# Patient Record
Sex: FEMALE | Race: WHITE | NOT HISPANIC OR LATINO | Employment: FULL TIME | URBAN - METROPOLITAN AREA
[De-identification: names, ages, dates, MRNs, and addresses within clinical notes are randomized per-mention and may not be internally consistent; named-entity substitution may affect disease eponyms.]

---

## 2017-08-10 ENCOUNTER — OFFICE VISIT (OUTPATIENT)
Dept: FAMILY MEDICINE CLINIC | Facility: CLINIC | Age: 37
End: 2017-08-10

## 2017-08-10 VITALS
TEMPERATURE: 99.1 F | SYSTOLIC BLOOD PRESSURE: 116 MMHG | OXYGEN SATURATION: 99 % | BODY MASS INDEX: 21.79 KG/M2 | RESPIRATION RATE: 14 BRPM | HEIGHT: 70 IN | WEIGHT: 152.2 LBS | DIASTOLIC BLOOD PRESSURE: 68 MMHG | HEART RATE: 60 BPM

## 2017-08-10 DIAGNOSIS — E55.9 VITAMIN D DEFICIENCY: ICD-10-CM

## 2017-08-10 DIAGNOSIS — Z13.29 SCREENING FOR THYROID DISORDER: ICD-10-CM

## 2017-08-10 DIAGNOSIS — Z00.00 ENCOUNTER FOR HEALTH MAINTENANCE EXAMINATION: Primary | ICD-10-CM

## 2017-08-10 DIAGNOSIS — D64.9 ANEMIA, UNSPECIFIED TYPE: ICD-10-CM

## 2017-08-10 DIAGNOSIS — Z13.220 SCREENING FOR LIPID DISORDERS: ICD-10-CM

## 2017-08-10 DIAGNOSIS — Z13.1 SCREENING FOR DIABETES MELLITUS: ICD-10-CM

## 2017-08-10 PROBLEM — F41.9 ANXIETY: Status: ACTIVE | Noted: 2017-08-10

## 2017-08-10 PROBLEM — L70.9 ACNE: Status: ACTIVE | Noted: 2017-08-10

## 2017-08-10 PROCEDURE — 99385 PREV VISIT NEW AGE 18-39: CPT | Performed by: FAMILY MEDICINE

## 2017-08-10 RX ORDER — FLUOXETINE HYDROCHLORIDE 20 MG/1
20 CAPSULE ORAL DAILY
COMMUNITY
End: 2019-09-20 | Stop reason: SDUPTHER

## 2017-08-10 NOTE — PROGRESS NOTES
Subjective   Antonieta Hill is a 37 y.o. female.     Chief Complaint   Patient presents with   • Establish Care     Patient needs to establish a care. She needs to have lab work done.        HPI     Patient is a new patient to our office today for health maintenance exam.  She has a minor history of anemia and possible vitamin deficiency.  She is needing labwork to evaluate these.  She has 2-4 alcoholic beverages a week.  No tobacco or recreational drug use.  She maintains a healthy diet and exercise regimen.  She is up-to-date on Pap smears.  Has a family history of hyperlipidemia and liver issues.    The following portions of the patient's history were reviewed and updated as appropriate: allergies, current medications, past family history, past medical history, past social history, past surgical history and problem list.    Review of Systems   Constitutional: Negative for activity change.   All other systems reviewed and are negative.      Objective  Vitals:    08/10/17 1030   BP: 116/68   Pulse: 60   Resp: 14   Temp: 99.1 °F (37.3 °C)   SpO2: 99%        Physical Exam   Constitutional: She is oriented to person, place, and time. She appears well-developed and well-nourished. No distress.   HENT:   Head: Normocephalic and atraumatic.   Right Ear: External ear normal.   Left Ear: External ear normal.   Nose: Nose normal.   Mouth/Throat: Oropharynx is clear and moist.   Eyes: Conjunctivae and EOM are normal. Pupils are equal, round, and reactive to light. Right eye exhibits no discharge. Left eye exhibits no discharge. No scleral icterus.   Neck: Normal range of motion. Neck supple.   Cardiovascular: Normal rate, regular rhythm and normal heart sounds.  Exam reveals no friction rub.    No murmur heard.  Pulmonary/Chest: Effort normal and breath sounds normal. No respiratory distress. She has no wheezes. She has no rales.   Abdominal: Soft. Bowel sounds are normal. She exhibits no distension. There is no  tenderness. There is no rebound and no guarding.   Lymphadenopathy:     She has no cervical adenopathy.   Neurological: She is alert and oriented to person, place, and time.   Skin: Skin is warm and dry. She is not diaphoretic.   Nursing note and vitals reviewed.        Current Outpatient Prescriptions:   •  FLUoxetine (PROzac) 20 MG capsule, Take 20 mg by mouth Daily., Disp: , Rfl:   •  SPIRONOLACTONE PO, Take  by mouth., Disp: , Rfl:     Procedures    Lab Results (most recent)     None          Assessment/Plan   Antonieta was seen today for establish care.    Diagnoses and all orders for this visit:    Encounter for health maintenance examination    Screening for lipid disorders  -     Lipid Panel    Screening for thyroid disorder  -     TSH    Screening for diabetes mellitus  -     Comprehensive Metabolic Panel  -     Hemoglobin A1c    Anemia, unspecified type  -     CBC Auto Differential  -     Iron and TIBC  -     Vitamin B12    Vitamin D deficiency  -     Vitamin D 25 Hydroxy    Labs as above to evaluate current status and screening.  We'll adjust treatment plan based on those results.    Return for As needed.      Ron Berkowitz MD

## 2017-08-11 LAB
25(OH)D3+25(OH)D2 SERPL-MCNC: 31.5 NG/ML (ref 30–100)
ALBUMIN SERPL-MCNC: 4.1 G/DL (ref 3.5–5.2)
ALBUMIN/GLOB SERPL: 1.8 G/DL
ALP SERPL-CCNC: 41 U/L (ref 39–117)
ALT SERPL-CCNC: 13 U/L (ref 1–33)
AST SERPL-CCNC: 15 U/L (ref 1–32)
BASOPHILS # BLD AUTO: 0.03 10*3/MM3 (ref 0–0.2)
BASOPHILS NFR BLD AUTO: 0.7 % (ref 0–1.5)
BILIRUB SERPL-MCNC: 0.4 MG/DL (ref 0.1–1.2)
BUN SERPL-MCNC: 12 MG/DL (ref 6–20)
BUN/CREAT SERPL: 14.8 (ref 7–25)
CALCIUM SERPL-MCNC: 9.6 MG/DL (ref 8.6–10.5)
CHLORIDE SERPL-SCNC: 101 MMOL/L (ref 98–107)
CHOLEST SERPL-MCNC: 192 MG/DL (ref 0–200)
CO2 SERPL-SCNC: 26.3 MMOL/L (ref 22–29)
CREAT SERPL-MCNC: 0.81 MG/DL (ref 0.57–1)
EOSINOPHIL # BLD AUTO: 0.25 10*3/MM3 (ref 0–0.7)
EOSINOPHIL NFR BLD AUTO: 5.7 % (ref 0.3–6.2)
ERYTHROCYTE [DISTWIDTH] IN BLOOD BY AUTOMATED COUNT: 13.3 % (ref 11.7–13)
GLOBULIN SER CALC-MCNC: 2.3 GM/DL
GLUCOSE SERPL-MCNC: 82 MG/DL (ref 65–99)
HBA1C MFR BLD: 4.7 % (ref 4.8–5.6)
HCT VFR BLD AUTO: 38.4 % (ref 35.6–45.5)
HDLC SERPL-MCNC: 66 MG/DL (ref 40–60)
HGB BLD-MCNC: 12.5 G/DL (ref 11.9–15.5)
IMM GRANULOCYTES # BLD: 0 10*3/MM3 (ref 0–0.03)
IMM GRANULOCYTES NFR BLD: 0 % (ref 0–0.5)
IRON SATN MFR SERPL: 25 % (ref 20–50)
IRON SERPL-MCNC: 80 MCG/DL (ref 37–145)
LDLC SERPL CALC-MCNC: 107 MG/DL (ref 0–100)
LYMPHOCYTES # BLD AUTO: 1.25 10*3/MM3 (ref 0.9–4.8)
LYMPHOCYTES NFR BLD AUTO: 28.5 % (ref 19.6–45.3)
MCH RBC QN AUTO: 30.7 PG (ref 26.9–32)
MCHC RBC AUTO-ENTMCNC: 32.6 G/DL (ref 32.4–36.3)
MCV RBC AUTO: 94.3 FL (ref 80.5–98.2)
MONOCYTES # BLD AUTO: 0.47 10*3/MM3 (ref 0.2–1.2)
MONOCYTES NFR BLD AUTO: 10.7 % (ref 5–12)
NEUTROPHILS # BLD AUTO: 2.39 10*3/MM3 (ref 1.9–8.1)
NEUTROPHILS NFR BLD AUTO: 54.4 % (ref 42.7–76)
PLATELET # BLD AUTO: 202 10*3/MM3 (ref 140–500)
POTASSIUM SERPL-SCNC: 4.4 MMOL/L (ref 3.5–5.2)
PROT SERPL-MCNC: 6.4 G/DL (ref 6–8.5)
RBC # BLD AUTO: 4.07 10*6/MM3 (ref 3.9–5.2)
SODIUM SERPL-SCNC: 141 MMOL/L (ref 136–145)
TIBC SERPL-MCNC: 318 MCG/DL
TRIGL SERPL-MCNC: 93 MG/DL (ref 0–150)
TSH SERPL DL<=0.005 MIU/L-ACNC: 3.31 MIU/ML (ref 0.27–4.2)
UIBC SERPL-MCNC: 238 MCG/DL
VIT B12 SERPL-MCNC: 195 PG/ML (ref 211–946)
VLDLC SERPL CALC-MCNC: 18.6 MG/DL (ref 5–40)
WBC # BLD AUTO: 4.39 10*3/MM3 (ref 4.5–10.7)

## 2018-07-26 ENCOUNTER — OFFICE VISIT (OUTPATIENT)
Dept: FAMILY MEDICINE CLINIC | Facility: CLINIC | Age: 38
End: 2018-07-26

## 2018-07-26 VITALS
TEMPERATURE: 98.3 F | SYSTOLIC BLOOD PRESSURE: 116 MMHG | DIASTOLIC BLOOD PRESSURE: 60 MMHG | OXYGEN SATURATION: 97 % | HEART RATE: 59 BPM | BODY MASS INDEX: 21.9 KG/M2 | RESPIRATION RATE: 14 BRPM | WEIGHT: 153 LBS | HEIGHT: 70 IN

## 2018-07-26 DIAGNOSIS — R53.83 FATIGUE, UNSPECIFIED TYPE: ICD-10-CM

## 2018-07-26 DIAGNOSIS — E03.9 HYPOTHYROIDISM, UNSPECIFIED TYPE: Primary | ICD-10-CM

## 2018-07-26 PROCEDURE — 99214 OFFICE O/P EST MOD 30 MIN: CPT | Performed by: FAMILY MEDICINE

## 2018-07-26 RX ORDER — LEVOTHYROXINE SODIUM 0.1 MG/1
100 TABLET ORAL DAILY
Qty: 45 TABLET | Refills: 1 | Status: SHIPPED | OUTPATIENT
Start: 2018-07-26 | End: 2018-09-12 | Stop reason: SDUPTHER

## 2018-07-26 RX ORDER — SPIRONOLACTONE 100 MG/1
100 TABLET, FILM COATED ORAL DAILY
Refills: 11 | COMMUNITY
Start: 2018-07-06 | End: 2019-03-13 | Stop reason: SDUPTHER

## 2018-07-26 NOTE — PROGRESS NOTES
Antonieta Hill is a 38 y.o. female.     Chief Complaint   Patient presents with   • Thyroid Problem     Patient is following up on high thyroid levels. She has recent labs at [place].        HPI     Patient is stated to discuss some issues that are new to me.  Patient was recently seen by her OB/GYN and had a thyroid panel done.  It revealed a TSH of 8.6.  She has been complaining of fatigue as well as night sweats and just a myriad of issues.  She's also concerned about energy levels.  She does have a family history of thyroid issues.  She does have other autoimmune diseases including psoriasis.    The following portions of the patient's history were reviewed and updated as appropriate: allergies, current medications, past family history, past medical history, past social history, past surgical history and problem list.    Review of Systems   Constitutional: Negative for activity change.   All other systems reviewed and are negative.      Objective  Vitals:    07/26/18 0918   BP: 116/60   Pulse: 59   Resp: 14   Temp: 98.3 °F (36.8 °C)   SpO2: 97%       Physical Exam   Constitutional: She is oriented to person, place, and time. She appears well-developed and well-nourished. No distress.   HENT:   Head: Normocephalic and atraumatic.   Right Ear: External ear normal.   Left Ear: External ear normal.   Nose: Nose normal.   Mouth/Throat: Oropharynx is clear and moist.   Eyes: Pupils are equal, round, and reactive to light. Conjunctivae and EOM are normal. Right eye exhibits no discharge. Left eye exhibits no discharge. No scleral icterus.   Cardiovascular: Normal rate, regular rhythm and normal heart sounds.    Pulmonary/Chest: Effort normal and breath sounds normal. No respiratory distress. She has no wheezes. She has no rales.   Abdominal: Soft. Bowel sounds are normal. She exhibits no distension. There is no tenderness.   Neurological: She is alert and oriented to person, place, and time.   Skin: Skin is warm  and dry. She is not diaphoretic.   Nursing note and vitals reviewed.        Current Outpatient Prescriptions:   •  FLUoxetine (PROzac) 20 MG capsule, Take 20 mg by mouth Daily., Disp: , Rfl:   •  spironolactone (ALDACTONE) 100 MG tablet, Take 100 mg by mouth Daily., Disp: , Rfl: 11  •  levothyroxine (SYNTHROID) 100 MCG tablet, Take 1 tablet by mouth Daily., Disp: 45 tablet, Rfl: 1    Procedures    Lab Results (most recent)     None              Antonieta was seen today for thyroid problem.    Diagnoses and all orders for this visit:    Hypothyroidism, unspecified type  -     Thyroid Panel With TSH  -     Thyroid Peroxidase Antibody  -     Thyroid Stimulating Immunoglobulin  -     levothyroxine (SYNTHROID) 100 MCG tablet; Take 1 tablet by mouth Daily.    Fatigue, unspecified type    I reviewed the blood work that was done at [place].  Summarized as above.  We will start levothyroxine 100 µg.  We discussed potential adverse side effects.  We'll also get repeat labs before her next office visit to assess the dose as well as other potential causes of her thyroid issue.      Return in about 6 weeks (around 9/6/2018) for Recheck.      Ron Berkowitz MD

## 2018-09-01 LAB
FT4I SERPL CALC-MCNC: 2.8 (ref 1.2–4.9)
T3RU NFR SERPL: 32 % (ref 24–39)
T4 SERPL-MCNC: 8.6 UG/DL (ref 4.5–12)
THYROPEROXIDASE AB SERPL-ACNC: 39 IU/ML (ref 0–34)
TSH SERPL DL<=0.005 MIU/L-ACNC: 3.14 UIU/ML (ref 0.45–4.5)
TSI ACT/NOR SER: <0.1 IU/L (ref 0–0.55)

## 2018-09-12 ENCOUNTER — OFFICE VISIT (OUTPATIENT)
Dept: FAMILY MEDICINE CLINIC | Facility: CLINIC | Age: 38
End: 2018-09-12

## 2018-09-12 VITALS
BODY MASS INDEX: 22.76 KG/M2 | DIASTOLIC BLOOD PRESSURE: 60 MMHG | RESPIRATION RATE: 14 BRPM | SYSTOLIC BLOOD PRESSURE: 122 MMHG | HEIGHT: 70 IN | HEART RATE: 66 BPM | TEMPERATURE: 98 F | OXYGEN SATURATION: 98 % | WEIGHT: 159 LBS

## 2018-09-12 DIAGNOSIS — E04.9 THYROID ENLARGEMENT: ICD-10-CM

## 2018-09-12 DIAGNOSIS — E03.9 HYPOTHYROIDISM, UNSPECIFIED TYPE: Primary | ICD-10-CM

## 2018-09-12 PROCEDURE — 99213 OFFICE O/P EST LOW 20 MIN: CPT | Performed by: FAMILY MEDICINE

## 2018-09-12 RX ORDER — LEVOTHYROXINE SODIUM 0.1 MG/1
100 TABLET ORAL DAILY
Qty: 90 TABLET | Refills: 3 | Status: SHIPPED | OUTPATIENT
Start: 2018-09-12 | End: 2019-09-16 | Stop reason: SDUPTHER

## 2018-09-12 NOTE — PROGRESS NOTES
Antonieta Hill is a 38 y.o. female.     Chief Complaint   Patient presents with   • Thyroid Problem     Patient has a follow up on thyroid med.        HPI     Patient presents today for follow-up on lab work that was done recently.  Patient was recently started 100 µg of levothyroxine due to hypothyroidism that was symptomatic.  Lab work showed a normal TSH as well as thyroid panel.  She did have elevated thyroid peroxidase antibodies.  Does have a history of psoriasis which is managed by dermatology.  States that she feels much better after starting the levothyroxine.    The following portions of the patient's history were reviewed and updated as appropriate: allergies, current medications, past family history, past medical history, past social history, past surgical history and problem list.    Review of Systems   Constitutional: Negative for activity change.   All other systems reviewed and are negative.      Objective  Vitals:    09/12/18 0808   BP: 122/60   Pulse: 66   Resp: 14   Temp: 98 °F (36.7 °C)   SpO2: 98%       Physical Exam   Constitutional: She is oriented to person, place, and time. She appears well-developed and well-nourished. No distress.   HENT:   Head: Normocephalic and atraumatic.   Right Ear: External ear normal.   Left Ear: External ear normal.   Nose: Nose normal.   Mouth/Throat: Oropharynx is clear and moist.   Eyes: Pupils are equal, round, and reactive to light. Conjunctivae and EOM are normal. Right eye exhibits no discharge. Left eye exhibits no discharge. No scleral icterus.   Neck: Thyromegaly present.   Cardiovascular: Normal rate, regular rhythm and normal heart sounds.    Pulmonary/Chest: Effort normal and breath sounds normal. No respiratory distress. She has no wheezes. She has no rales.   Abdominal: Soft. Bowel sounds are normal. She exhibits no distension. There is no tenderness.   Neurological: She is alert and oriented to person, place, and time.   Skin: Skin is warm  and dry. She is not diaphoretic.   Nursing note and vitals reviewed.        Current Outpatient Prescriptions:   •  FLUoxetine (PROzac) 20 MG capsule, Take 20 mg by mouth Daily., Disp: , Rfl:   •  levothyroxine (SYNTHROID) 100 MCG tablet, Take 1 tablet by mouth Daily., Disp: 90 tablet, Rfl: 3  •  spironolactone (ALDACTONE) 100 MG tablet, Take 100 mg by mouth Daily., Disp: , Rfl: 11  Current outpatient and discharge medications have been reconciled for the patient.  Reviewed by: Ron Berkowitz MD      Procedures    Lab Results (most recent)     None                  Antonieta was seen today for thyroid problem.    Diagnoses and all orders for this visit:    Hypothyroidism, unspecified type  -     levothyroxine (SYNTHROID) 100 MCG tablet; Take 1 tablet by mouth Daily.  -     US Thyroid  -     Ambulatory Referral to Endocrinology    Thyroid enlargement  -     US Thyroid  -     Ambulatory Referral to Endocrinology    Will refer to endocrinology and get a thyroid ultrasound.  Reviewed the blood work the patient.  Refills given for levothyroxine 100 µg.      Return in about 4 weeks (around 10/10/2018) for Recheck.      Ron Berkowitz MD

## 2018-11-21 ENCOUNTER — OFFICE VISIT (OUTPATIENT)
Dept: ENDOCRINOLOGY | Age: 38
End: 2018-11-21

## 2018-11-21 VITALS
BODY MASS INDEX: 22.19 KG/M2 | DIASTOLIC BLOOD PRESSURE: 68 MMHG | OXYGEN SATURATION: 98 % | WEIGHT: 155 LBS | SYSTOLIC BLOOD PRESSURE: 104 MMHG | HEART RATE: 91 BPM | HEIGHT: 70 IN

## 2018-11-21 DIAGNOSIS — E03.9 ACQUIRED HYPOTHYROIDISM: Primary | ICD-10-CM

## 2018-11-21 DIAGNOSIS — E06.3 HASHIMOTO'S DISEASE: ICD-10-CM

## 2018-11-21 DIAGNOSIS — E04.0 SIMPLE GOITER: ICD-10-CM

## 2018-11-21 PROCEDURE — 99204 OFFICE O/P NEW MOD 45 MIN: CPT | Performed by: INTERNAL MEDICINE

## 2018-11-21 NOTE — PROGRESS NOTES
Chief Complaint   Patient presents with   • Hypothyroidism   NEW PATIENT APPOINTMENT/ HYPOTHYROIDISM    Antonieta MARCK Liz 38 y.o. WF presents as a new patient for the evaluation of Hypothyroidism. Consulted by Dr. Berkowitz.   Pt was diagnosed with hypothyroidism about a 5 - 6 months ago, that was on routine blood work up also based on her symptoms of fatigue and abnormal menstrual bleeding.     Pt has been started on levothyroxine 100 mcg oral daily based on her blood work up, does take medication on empty stomach.     Energy levels are better on the medication, weight has been stable, normal BM, no hair loss, c/o increased sweating along with night sweats, c/o dry skin, sleep is ok. c/o heat intolerance and no cold intolerance. No c/o tremors, racing of heart and no eye symptoms.   Denied c/o difficulty breathing, swallowing and change in voice.   Family hx of thyroid disease - in her grand mother.     Menarche at age 13, periods are regular for most part, endometrial ablation done due to heavy periods. No further bleeding episodes after the start of the thyroid medication.   She has 2 Kids of her own.   Didn't have the thyroid [place]    Reviewed primary care physician's/consulting physician documentation and lab results :   TSH - 8.3 in July 2018    I have reviewed the patient's allergies, medicines, past medical hx, family hx and social hx in detail.    History reviewed. No pertinent past medical history.    Family History   Problem Relation Age of Onset   • Liver disease Mother    • Hypertension Father    • Hyperlipidemia Father        Social History     Socioeconomic History   • Marital status:      Spouse name: Not on file   • Number of children: Not on file   • Years of education: Not on file   • Highest education level: Not on file   Social Needs   • Financial resource strain: Not on file   • Food insecurity - worry: Not on file   • Food insecurity - inability: Not on file   • Transportation needs -  "medical: Not on file   • Transportation needs - non-medical: Not on file   Occupational History   • Not on file   Tobacco Use   • Smoking status: Never Smoker   Substance and Sexual Activity   • Alcohol use: Yes     Comment: occa   • Drug use: No   • Sexual activity: Not on file   Other Topics Concern   • Not on file   Social History Narrative   • Not on file       No Known Allergies      Current Outpatient Medications:   •  FLUoxetine (PROzac) 20 MG capsule, Take 20 mg by mouth Daily., Disp: , Rfl:   •  levothyroxine (SYNTHROID) 100 MCG tablet, Take 1 tablet by mouth Daily., Disp: 90 tablet, Rfl: 3  •  spironolactone (ALDACTONE) 100 MG tablet, Take 100 mg by mouth Daily., Disp: , Rfl: 11     Review of Systems   Constitutional: Negative for appetite change, fatigue and fever.   Eyes: Negative for visual disturbance.   Respiratory: Negative for shortness of breath.    Cardiovascular: Negative for palpitations and leg swelling.   Gastrointestinal: Negative for abdominal pain and vomiting.   Endocrine: Negative for polydipsia and polyuria.   Musculoskeletal: Negative for joint swelling and neck pain.   Skin: Negative for rash.   Neurological: Negative for weakness and numbness.   Psychiatric/Behavioral: Negative for behavioral problems.       Objective:    /68   Pulse 91   Ht 177.8 cm (70\")   Wt 70.3 kg (155 lb)   SpO2 98%   BMI 22.24 kg/m²     Physical Exam   Constitutional: She is oriented to person, place, and time. She appears well-nourished.   HENT:   Head: Normocephalic and atraumatic.   Eyes: Conjunctivae and EOM are normal. No scleral icterus.   Neck: Normal range of motion. Neck supple. No thyromegaly present.   Cardiovascular: Normal rate and normal heart sounds. Exam reveals no friction rub.   No murmur heard.  Pulmonary/Chest: Effort normal and breath sounds normal. No stridor. She has no wheezes. She has no rales.   Abdominal: Soft. Bowel sounds are normal. She exhibits no distension. There is " no tenderness.   Musculoskeletal: She exhibits no edema or tenderness.   Lymphadenopathy:     She has no cervical adenopathy.   Neurological: She is alert and oriented to person, place, and time.   Skin: Skin is warm and dry. She is not diaphoretic.   Psychiatric: She has a normal mood and affect.   Vitals reviewed.      Results Review:    I reviewed the patient's new clinical results.    Office Visit on 07/26/2018   Component Date Value Ref Range Status   • TSH 08/30/2018 3.140  0.450 - 4.500 uIU/mL Final   • T4, Total 08/30/2018 8.6  4.5 - 12.0 ug/dL Final   • T3 Uptake 08/30/2018 32  24 - 39 % Final   • Free Thyroxine Index 08/30/2018 2.8  1.2 - 4.9 Final   • Thyroid Peroxidase Antibody 08/30/2018 39* 0 - 34 IU/mL Final   • Thyroid Stimulating Immunoglobulin 08/30/2018 <0.10  0.00 - 0.55 IU/L Final         Antonieta was seen today for hypothyroidism.    Diagnoses and all orders for this visit:    Acquired hypothyroidism  -     TSH  -     T4, Free  -     Vitamin D 25 Hydroxy  -     Vitamin B12 & Folate  -     TSH; Future  -     T4, Free; Future  -     Basic Metabolic Panel; Future  -     Vitamin B12 & Folate; Future  -     Vitamin D 25 Hydroxy; Future  -     Lipid Panel; Future    Hashimoto's disease  -     TSH  -     T4, Free  -     Vitamin D 25 Hydroxy  -     Vitamin B12 & Folate  -     TSH; Future  -     T4, Free; Future  -     Basic Metabolic Panel; Future  -     Vitamin B12 & Folate; Future  -     Vitamin D 25 Hydroxy; Future  -     Lipid Panel; Future    Simple goiter  -     US Thyroid; Future      Hypothyroidism - levels not stable.   Symptoms are worse  Will continue levothyroxin 100 µg oral daily  Will check thyroid panel.    Will check vitamin D, B12 levels    Explained to the patient that the TPO antibody will be positive which could be the cause for her hypothyroidism-Hashimoto's thyroiditis.    Thyroid enlargement  Will proceed with a thyroid ultrasound.    Thank you for asking me to see your  "patient, Antonieta Hill in consultation.        Blade Moreno MD  11/21/18    [facility] Dragon / transcription disclaimer:     \"Dictated utilizing Dragon dictation\".          "

## 2018-11-22 LAB
25(OH)D3+25(OH)D2 SERPL-MCNC: 29.5 NG/ML (ref 30–100)
FOLATE SERPL-MCNC: 15.2 NG/ML (ref 4.78–24.2)
T4 FREE SERPL-MCNC: 1.4 NG/DL (ref 0.93–1.7)
TSH SERPL DL<=0.005 MIU/L-ACNC: 1.12 MIU/ML (ref 0.27–4.2)
VIT B12 SERPL-MCNC: 298 PG/ML (ref 211–946)

## 2018-11-29 ENCOUNTER — HOSPITAL ENCOUNTER (OUTPATIENT)
Dept: ULTRASOUND IMAGING | Facility: HOSPITAL | Age: 38
Discharge: HOME | End: 2018-11-29
Attending: INTERNAL MEDICINE | Admitting: INTERNAL MEDICINE

## 2018-11-29 DIAGNOSIS — E04.0 SIMPLE GOITER: ICD-10-CM

## 2018-11-29 PROCEDURE — 76536 US EXAM OF HEAD AND NECK: CPT

## 2018-12-06 NOTE — PROGRESS NOTES
Mail results to pt, no treatment changes at this time. No thyroid nodules noted. No need for FNA at this time

## 2019-03-04 ENCOUNTER — OFFICE VISIT (OUTPATIENT)
Dept: FAMILY MEDICINE CLINIC | Facility: CLINIC | Age: 39
End: 2019-03-04

## 2019-03-04 VITALS
OXYGEN SATURATION: 99 % | RESPIRATION RATE: 18 BRPM | BODY MASS INDEX: 22.62 KG/M2 | HEIGHT: 70 IN | DIASTOLIC BLOOD PRESSURE: 66 MMHG | SYSTOLIC BLOOD PRESSURE: 110 MMHG | HEART RATE: 71 BPM | WEIGHT: 158 LBS | TEMPERATURE: 97.9 F

## 2019-03-04 DIAGNOSIS — M79.644 PAIN OF RIGHT MIDDLE FINGER: Primary | ICD-10-CM

## 2019-03-04 PROCEDURE — 73140 X-RAY EXAM OF FINGER(S): CPT | Performed by: FAMILY MEDICINE

## 2019-03-04 PROCEDURE — 99214 OFFICE O/P EST MOD 30 MIN: CPT | Performed by: FAMILY MEDICINE

## 2019-03-04 NOTE — PROGRESS NOTES
Antonieta Hill is a 38 y.o. female.     Chief Complaint   Patient presents with   • Hand Pain     patient jammed her right middle finger .       HPI     Patient presents to the office to discuss a problem that is new to me.  In January she was living and injured the middle finger in the right hand.  She had swelling and pain with bruising.  Since then she continues to have pain with range of motion.  Continues to have some swelling.  Has tried some mild over-the-counter anti-pain medications but they have not helped.    The following portions of the patient's history were reviewed and updated as appropriate: allergies, current medications, past family history, past medical history, past social history, past surgical history and problem list.    Review of Systems   Musculoskeletal: Negative for gait problem and joint swelling.   All other systems reviewed and are negative.      Objective  Vitals:    03/04/19 1545   BP: 110/66   Pulse: 71   Resp: 18   Temp: 97.9 °F (36.6 °C)   SpO2: 99%       Physical Exam   Constitutional: She is oriented to person, place, and time. She appears well-developed and well-nourished. No distress.   Musculoskeletal:        Right hand: She exhibits tenderness, deformity and swelling.        Hands:  Neurological: She is alert and oriented to person, place, and time.   Skin: She is not diaphoretic.   Psychiatric: She has a normal mood and affect. Her behavior is normal.   Nursing note and vitals reviewed.        Current Outpatient Medications:   •  FLUoxetine (PROzac) 20 MG capsule, Take 20 mg by mouth Daily., Disp: , Rfl:   •  levothyroxine (SYNTHROID) 100 MCG tablet, Take 1 tablet by mouth Daily., Disp: 90 tablet, Rfl: 3  •  spironolactone (ALDACTONE) 100 MG tablet, Take 100 mg by mouth Daily., Disp: , Rfl: 11  Current outpatient and discharge medications have been reconciled for the patient.  Reviewed by: Ron Berkowitz MD      Procedures    Lab Results (most recent)     None                   Antonieta was seen today for hand pain.    Diagnoses and all orders for this visit:    Pain of right middle finger  -     XR Finger 2+ View Right (In Office)  -     Ambulatory Referral to Hand Surgery      3 view of the right middle finger show no acute osseous abnormality.    Likely ligamentous injury.  Buddy taped in the office and will refer to hand.    Return for As needed.      Ron Berkowitz MD

## 2019-03-13 RX ORDER — SPIRONOLACTONE 100 MG/1
TABLET, FILM COATED ORAL
Qty: 30 TABLET | Refills: 0 | Status: SHIPPED | OUTPATIENT
Start: 2019-03-13 | End: 2019-04-13 | Stop reason: SDUPTHER

## 2019-04-15 RX ORDER — SPIRONOLACTONE 100 MG/1
TABLET, FILM COATED ORAL
Qty: 30 TABLET | Refills: 0 | Status: SHIPPED | OUTPATIENT
Start: 2019-04-15 | End: 2019-06-02 | Stop reason: SDUPTHER

## 2019-05-20 ENCOUNTER — RESULTS ENCOUNTER (OUTPATIENT)
Dept: ENDOCRINOLOGY | Age: 39
End: 2019-05-20

## 2019-05-20 DIAGNOSIS — E06.3 HASHIMOTO'S DISEASE: ICD-10-CM

## 2019-05-20 DIAGNOSIS — E03.9 ACQUIRED HYPOTHYROIDISM: ICD-10-CM

## 2019-05-21 ENCOUNTER — APPOINTMENT (OUTPATIENT)
Dept: LAB | Facility: HOSPITAL | Age: 39
End: 2019-05-21

## 2019-05-21 LAB
25(OH)D3 SERPL-MCNC: 27.7 NG/ML (ref 30–100)
ANION GAP SERPL CALCULATED.3IONS-SCNC: 10.3 MMOL/L
BUN BLD-MCNC: 12 MG/DL (ref 6–20)
BUN/CREAT SERPL: 16.4 (ref 7–25)
CALCIUM SPEC-SCNC: 9.1 MG/DL (ref 8.6–10.5)
CHLORIDE SERPL-SCNC: 103 MMOL/L (ref 98–107)
CHOLEST SERPL-MCNC: 160 MG/DL (ref 0–200)
CO2 SERPL-SCNC: 23.7 MMOL/L (ref 22–29)
CREAT BLD-MCNC: 0.73 MG/DL (ref 0.57–1)
FOLATE SERPL-MCNC: 12.2 NG/ML (ref 4.78–24.2)
GFR SERPL CREATININE-BSD FRML MDRD: 88 ML/MIN/1.73
GLUCOSE BLD-MCNC: 95 MG/DL (ref 65–99)
HDLC SERPL-MCNC: 61 MG/DL (ref 40–60)
LDLC SERPL CALC-MCNC: 87 MG/DL (ref 0–100)
LDLC/HDLC SERPL: 1.43 {RATIO}
POTASSIUM BLD-SCNC: 3.9 MMOL/L (ref 3.5–5.2)
SODIUM BLD-SCNC: 137 MMOL/L (ref 136–145)
T4 FREE SERPL-MCNC: 1.38 NG/DL (ref 0.93–1.7)
TRIGL SERPL-MCNC: 59 MG/DL (ref 0–150)
TSH SERPL DL<=0.05 MIU/L-ACNC: 1.54 MIU/ML (ref 0.27–4.2)
VIT B12 BLD-MCNC: 344 PG/ML (ref 211–946)
VLDLC SERPL-MCNC: 11.8 MG/DL (ref 5–40)

## 2019-05-21 PROCEDURE — 82746 ASSAY OF FOLIC ACID SERUM: CPT | Performed by: INTERNAL MEDICINE

## 2019-05-21 PROCEDURE — 80048 BASIC METABOLIC PNL TOTAL CA: CPT | Performed by: INTERNAL MEDICINE

## 2019-05-21 PROCEDURE — 84443 ASSAY THYROID STIM HORMONE: CPT | Performed by: INTERNAL MEDICINE

## 2019-05-21 PROCEDURE — 82607 VITAMIN B-12: CPT | Performed by: INTERNAL MEDICINE

## 2019-05-21 PROCEDURE — 36415 COLL VENOUS BLD VENIPUNCTURE: CPT | Performed by: INTERNAL MEDICINE

## 2019-05-21 PROCEDURE — 84439 ASSAY OF FREE THYROXINE: CPT | Performed by: INTERNAL MEDICINE

## 2019-05-21 PROCEDURE — 80061 LIPID PANEL: CPT | Performed by: INTERNAL MEDICINE

## 2019-05-21 PROCEDURE — 82306 VITAMIN D 25 HYDROXY: CPT | Performed by: INTERNAL MEDICINE

## 2019-06-03 RX ORDER — SPIRONOLACTONE 100 MG/1
TABLET, FILM COATED ORAL
Qty: 30 TABLET | Refills: 0 | Status: SHIPPED | OUTPATIENT
Start: 2019-06-03 | End: 2019-06-30 | Stop reason: SDUPTHER

## 2019-06-13 ENCOUNTER — OFFICE VISIT (OUTPATIENT)
Dept: ENDOCRINOLOGY | Age: 39
End: 2019-06-13

## 2019-06-13 VITALS
BODY MASS INDEX: 21.09 KG/M2 | SYSTOLIC BLOOD PRESSURE: 98 MMHG | HEART RATE: 82 BPM | DIASTOLIC BLOOD PRESSURE: 70 MMHG | OXYGEN SATURATION: 98 % | WEIGHT: 147 LBS

## 2019-06-13 DIAGNOSIS — E04.0 SIMPLE GOITER: ICD-10-CM

## 2019-06-13 DIAGNOSIS — E03.9 ACQUIRED HYPOTHYROIDISM: Primary | ICD-10-CM

## 2019-06-13 PROCEDURE — 99214 OFFICE O/P EST MOD 30 MIN: CPT | Performed by: INTERNAL MEDICINE

## 2019-06-13 NOTE — PROGRESS NOTES
38 y.o.    Patient Care Team:  Ron Berkowitz MD as PCP - General (Family Medicine)    Chief Complaint:    FOLLOW UP/ HYPOTHYROIDSIM  Subjective     HPI    Antonieta Hill 38 y.o. WF presents as a new patient for the evaluation of Hypothyroidism. Consulted by Dr. Berkowitz.   Pt was diagnosed with hypothyroidism about a 1 year ago, that was on routine blood work up also based on her symptoms of fatigue and abnormal menstrual bleeding.      Today in the clinic patient reports that she is on levothyroxine 100 mcg oral daily, compliant with the medication.    She does report that her energy levels have improved when the medication was initially started and now her energy levels have plateaued, weight has been stable, normal bowel movements, improved hair loss, no increased sweating, does complain of some dry skin, sleep is good.  Complains of heat intolerance.  No hyperthyroid symptoms.  Denied c/o difficulty breathing, swallowing and change in voice.   Family hx of thyroid disease - in her grand mother.      Menarche at age 13, periods are regular for most part, endometrial ablation done due to heavy periods.   She has 2 Kids of her own.     Thyroid ultrasound from November 2018  1. Mild thyromegaly with heterogeneous thyroid gland.  2. No discrete nodules are seen       Reviewed primary care physician's/consulting physician documentation and lab results :     Interval History      The following portions of the patient's history were reviewed and updated as appropriate: allergies, current medications, past family history, past medical history, past social history, past surgical history and problem list.    History reviewed. No pertinent past medical history.  Family History   Problem Relation Age of Onset   • Liver disease Mother    • Hypertension Father    • Hyperlipidemia Father      Social History     Socioeconomic History   • Marital status:      Spouse name: Not on file   • Number of children: Not on  file   • Years of education: Not on file   • Highest education level: Not on file   Tobacco Use   • Smoking status: Never Smoker   Substance and Sexual Activity   • Alcohol use: Yes     Comment: occa   • Drug use: No     No Known Allergies    Current Outpatient Medications:   •  FLUoxetine (PROzac) 20 MG capsule, Take 20 mg by mouth Daily., Disp: , Rfl:   •  levothyroxine (SYNTHROID) 100 MCG tablet, Take 1 tablet by mouth Daily., Disp: 90 tablet, Rfl: 3  •  spironolactone (ALDACTONE) 100 MG tablet, TAKE 1 TABLET BY MOUTH EVERY DAY, Disp: 30 tablet, Rfl: 0        Review of Systems   Constitutional: Positive for appetite change and fatigue. Negative for fever.   Eyes: Negative for visual disturbance.   Respiratory: Negative for shortness of breath.    Cardiovascular: Negative for palpitations and leg swelling.   Gastrointestinal: Negative for abdominal pain and vomiting.   Endocrine: Negative for polydipsia and polyuria.   Musculoskeletal: Negative for joint swelling and neck pain.   Skin: Negative for rash.   Neurological: Negative for weakness and numbness.   Psychiatric/Behavioral: Negative for behavioral problems.       Objective       Vitals:    06/13/19 1148   BP: 98/70   Pulse: 82   SpO2: 98%   Weight: 66.7 kg (147 lb)     Body mass index is 21.09 kg/m².      Physical Exam   Constitutional: She is oriented to person, place, and time. She appears well-nourished.   HENT:   Head: Normocephalic and atraumatic.   Eyes: Conjunctivae and EOM are normal. No scleral icterus.   Neck: Normal range of motion. Neck supple. No thyromegaly present.   Cardiovascular: Normal rate and normal heart sounds. Exam reveals no friction rub.   No murmur heard.  Pulmonary/Chest: Effort normal and breath sounds normal. No stridor. She has no wheezes. She has no rales.   Abdominal: Soft. Bowel sounds are normal. She exhibits no distension. There is no tenderness.   Musculoskeletal: She exhibits no edema or tenderness.   Lymphadenopathy:      She has no cervical adenopathy.   Neurological: She is alert and oriented to person, place, and time.   Skin: Skin is warm and dry. She is not diaphoretic.   Psychiatric: She has a normal mood and affect.   Vitals reviewed.    Results Review:     I reviewed the patient's new clinical results and mentioned them above in HPI and in plan as well.    Medical records reviewed  Summary:done      Results Encounter on 05/20/2019   Component Date Value Ref Range Status   • TSH 05/21/2019 1.540  0.270 - 4.200 mIU/mL Final   • Free T4 05/21/2019 1.38  0.93 - 1.70 ng/dL Final   • Glucose 05/21/2019 95  65 - 99 mg/dL Final   • BUN 05/21/2019 12  6 - 20 mg/dL Final   • Creatinine 05/21/2019 0.73  0.57 - 1.00 mg/dL Final   • Sodium 05/21/2019 137  136 - 145 mmol/L Final   • Potassium 05/21/2019 3.9  3.5 - 5.2 mmol/L Final   • Chloride 05/21/2019 103  98 - 107 mmol/L Final   • CO2 05/21/2019 23.7  22.0 - 29.0 mmol/L Final   • Calcium 05/21/2019 9.1  8.6 - 10.5 mg/dL Final   • eGFR Non African Amer 05/21/2019 88  >60 mL/min/1.73 Final   • BUN/Creatinine Ratio 05/21/2019 16.4  7.0 - 25.0 Final   • Anion Gap 05/21/2019 10.3  mmol/L Final   • Folate 05/21/2019 12.20  4.78 - 24.20 ng/mL Final   • Vitamin B-12 05/21/2019 344  211 - 946 pg/mL Final   • 25 Hydroxy, Vitamin D 05/21/2019 27.7* 30.0 - 100.0 ng/ml Final   • Total Cholesterol 05/21/2019 160  0 - 200 mg/dL Final   • Triglycerides 05/21/2019 59  0 - 150 mg/dL Final   • HDL Cholesterol 05/21/2019 61* 40 - 60 mg/dL Final   • LDL Cholesterol  05/21/2019 87  0 - 100 mg/dL Final   • VLDL Cholesterol 05/21/2019 11.8  5 - 40 mg/dL Final   • LDL/HDL Ratio 05/21/2019 1.43   Final     Lab Results   Component Value Date    HGBA1C 4.70 (L) 08/10/2017     Lab Results   Component Value Date    CREATININE 0.73 05/21/2019     Imaging Results (most recent)     None                Assessment and Plan:    Diagnoses and all orders for this visit:    Acquired hypothyroidism  -     TSH; Future  -   "   T4, Free; Future  -     Basic Metabolic Panel; Future  -     Vitamin B12 & Folate; Future  -     Vitamin D 25 Hydroxy; Future    Simple goiter  -     TSH; Future  -     T4, Free; Future  -     Basic Metabolic Panel; Future  -     Vitamin B12 & Folate; Future  -     Vitamin D 25 Hydroxy; Future      Hypothyroidism  Symptoms are worse  Explained to the patient that her clinical symptoms might not be related to her hypothyroidism as her thyroid panel is at goal.  Continue levothyroxine 100 mcg oral daily    Simple goiter  Noted no thyroid nodules on the thyroid ultrasound from November 2018  Explained to the patient that her thyroid is in general and enlarged but does not have any nodules that require a biopsy    On spironolactone due to acne, advised the patient to discuss with her dermatologist if she can either come off of the medication or decrease the dosage.    Reviewed Lab results with the patient.             Blade Moreno MD  06/13/19    EMR Dragon / transcription disclaimer:     \"Dictated utilizing Dragon dictation\".  "

## 2019-07-01 RX ORDER — SPIRONOLACTONE 100 MG/1
TABLET, FILM COATED ORAL
Qty: 30 TABLET | Refills: 0 | Status: SHIPPED | OUTPATIENT
Start: 2019-07-01 | End: 2019-08-05 | Stop reason: SDUPTHER

## 2019-08-05 RX ORDER — SPIRONOLACTONE 100 MG/1
TABLET, FILM COATED ORAL
Qty: 30 TABLET | Refills: 0 | Status: SHIPPED | OUTPATIENT
Start: 2019-08-05 | End: 2019-09-07 | Stop reason: SDUPTHER

## 2019-09-09 RX ORDER — SPIRONOLACTONE 100 MG/1
TABLET, FILM COATED ORAL
Qty: 30 TABLET | Refills: 0 | Status: SHIPPED | OUTPATIENT
Start: 2019-09-09 | End: 2019-09-20 | Stop reason: SDUPTHER

## 2019-09-09 RX ORDER — SPIRONOLACTONE 100 MG/1
TABLET, FILM COATED ORAL
Qty: 30 TABLET | Refills: 0 | Status: SHIPPED | OUTPATIENT
Start: 2019-09-09 | End: 2019-10-11 | Stop reason: SDUPTHER

## 2019-09-13 DIAGNOSIS — E03.9 HYPOTHYROIDISM, UNSPECIFIED TYPE: ICD-10-CM

## 2019-09-13 RX ORDER — LEVOTHYROXINE SODIUM 0.1 MG/1
100 TABLET ORAL DAILY
Qty: 90 TABLET | Refills: 0 | Status: CANCELLED | OUTPATIENT
Start: 2019-09-13

## 2019-09-16 DIAGNOSIS — E03.9 HYPOTHYROIDISM, UNSPECIFIED TYPE: ICD-10-CM

## 2019-09-16 RX ORDER — LEVOTHYROXINE SODIUM 0.1 MG/1
100 TABLET ORAL DAILY
Qty: 90 TABLET | Refills: 0 | Status: SHIPPED | OUTPATIENT
Start: 2019-09-16 | End: 2019-09-18 | Stop reason: SDUPTHER

## 2019-09-18 DIAGNOSIS — E03.9 HYPOTHYROIDISM, UNSPECIFIED TYPE: ICD-10-CM

## 2019-09-18 RX ORDER — LEVOTHYROXINE SODIUM 0.1 MG/1
100 TABLET ORAL DAILY
Qty: 90 TABLET | Refills: 0 | Status: SHIPPED | OUTPATIENT
Start: 2019-09-18 | End: 2020-03-24

## 2019-09-20 ENCOUNTER — OFFICE VISIT (OUTPATIENT)
Dept: FAMILY MEDICINE CLINIC | Facility: CLINIC | Age: 39
End: 2019-09-20

## 2019-09-20 VITALS
DIASTOLIC BLOOD PRESSURE: 62 MMHG | SYSTOLIC BLOOD PRESSURE: 118 MMHG | RESPIRATION RATE: 16 BRPM | HEART RATE: 69 BPM | HEIGHT: 70 IN | OXYGEN SATURATION: 100 % | WEIGHT: 149 LBS | BODY MASS INDEX: 21.33 KG/M2 | TEMPERATURE: 98.3 F

## 2019-09-20 DIAGNOSIS — R21 SKIN RASH: ICD-10-CM

## 2019-09-20 DIAGNOSIS — F41.9 ANXIETY: Primary | ICD-10-CM

## 2019-09-20 PROCEDURE — 99214 OFFICE O/P EST MOD 30 MIN: CPT | Performed by: FAMILY MEDICINE

## 2019-09-20 RX ORDER — FLUOXETINE HYDROCHLORIDE 40 MG/1
40 CAPSULE ORAL DAILY
Qty: 30 CAPSULE | Refills: 1 | Status: SHIPPED | OUTPATIENT
Start: 2019-09-20 | End: 2019-11-05 | Stop reason: SDUPTHER

## 2019-09-20 NOTE — PROGRESS NOTES
Antonieta Hill is a 39 y.o. female.     Chief Complaint   Patient presents with   • Anxiety     patient needs to discuss her anxiety med.   • Rash     patient is having rash it is painful, it is in her buttocks.        HPI     Patient presents the office today to follow-up on anxiety as well as to discuss an issue that is new to me.  Patient has been taking Prozac 20 mg daily with moderate results up until about 6 weeks ago.  She does have increased episodes of sadness and crying.  Has been under quite a bit of stress.  No suicidal thoughts or ideation.  Patient also states that she has a rash on her left buttock.  It is painful.  Not overly itchy.    The following portions of the patient's history were reviewed and updated as appropriate: allergies, current medications, past family history, past medical history, past social history, past surgical history and problem list.    Review of Systems   Skin: Positive for color change and rash.   Psychiatric/Behavioral: The patient is nervous/anxious.    All other systems reviewed and are negative.    I have reviewed and agree with documentation of above ROS.    Objective  Vitals:    09/20/19 1550   BP: 118/62   Pulse: 69   Resp: 16   Temp: 98.3 °F (36.8 °C)   SpO2: 100%       Physical Exam   Constitutional: She is oriented to person, place, and time. She appears well-developed and well-nourished. No distress.   HENT:   Head: Normocephalic and atraumatic.   Right Ear: External ear normal.   Left Ear: External ear normal.   Nose: Nose normal.   Mouth/Throat: Oropharynx is clear and moist.   Eyes: Conjunctivae and EOM are normal. Pupils are equal, round, and reactive to light. Right eye exhibits no discharge. Left eye exhibits no discharge. No scleral icterus.   Cardiovascular: Normal rate, regular rhythm and normal heart sounds.   Pulmonary/Chest: Effort normal and breath sounds normal. No respiratory distress. She has no wheezes. She has no rales.   Abdominal:  Soft. Bowel sounds are normal. She exhibits no distension. There is no tenderness.   Neurological: She is alert and oriented to person, place, and time.   Skin: Skin is warm and dry. She is not diaphoretic.        On the left gluteal area there is an area of erythematous, raised, vesicular lesions.   Nursing note and vitals reviewed.        Current Outpatient Medications:   •  FLUoxetine (PROzac) 40 MG capsule, Take 1 capsule by mouth Daily., Disp: 30 capsule, Rfl: 1  •  levothyroxine (SYNTHROID, LEVOTHROID) 100 MCG tablet, TAKE 1 TABLET BY MOUTH DAILY, Disp: 90 tablet, Rfl: 0  •  spironolactone (ALDACTONE) 100 MG tablet, TAKE 1 TABLET BY MOUTH EVERY DAY (Patient taking differently: TAKE half  TABLET BY MOUTH EVERY DAY), Disp: 30 tablet, Rfl: 0  Current outpatient and discharge medications have been reconciled for the patient.  Reviewed by: Ron Berkowitz MD      Procedures    Lab Results (most recent)     None                  Antonieta was seen today for anxiety and rash.    Diagnoses and all orders for this visit:    Anxiety  -     FLUoxetine (PROzac) 40 MG capsule; Take 1 capsule by mouth Daily.    Skin rash  -     HIV-1 / O / 2 Ag / Antibody 4th Generation  -     HSV 1 & 2 - Specific Antibody, IgG  -     HSV 1 & 2 IgM, Antibodies, Indirect  -     RPR  -     Ambulatory Referral to Dermatology    Unsure of the etiology surrounding the patient's rash.  It does appear to be herpetic in nature.  Could be shingles but could also be genital herpes.  Will get labs as above to rule out genital herpes.  Will increase Prozac to 40 mg daily.  We will follow-up in 4 weeks.      Return in about 4 weeks (around 10/18/2019) for Recheck.      Ron Berkowitz MD

## 2019-09-23 LAB
HIV 1+2 AB+HIV1 P24 AG SERPL QL IA: NON REACTIVE
HSV1 IGG SER IA-ACNC: <0.91 INDEX (ref 0–0.9)
HSV1 IGM TITR SER IF: NORMAL TITER
HSV2 IGG SER IA-ACNC: <0.91 INDEX (ref 0–0.9)
HSV2 IGM TITR SER IF: NORMAL TITER
RPR SER QL: NORMAL

## 2019-10-11 RX ORDER — SPIRONOLACTONE 100 MG/1
TABLET, FILM COATED ORAL
Qty: 30 TABLET | Refills: 0 | Status: SHIPPED | OUTPATIENT
Start: 2019-10-11 | End: 2019-12-16 | Stop reason: SDUPTHER

## 2019-11-05 ENCOUNTER — OFFICE VISIT (OUTPATIENT)
Dept: FAMILY MEDICINE CLINIC | Facility: CLINIC | Age: 39
End: 2019-11-05

## 2019-11-05 VITALS
OXYGEN SATURATION: 98 % | WEIGHT: 155.2 LBS | BODY MASS INDEX: 22.22 KG/M2 | RESPIRATION RATE: 16 BRPM | HEART RATE: 70 BPM | HEIGHT: 70 IN | TEMPERATURE: 98.3 F | DIASTOLIC BLOOD PRESSURE: 60 MMHG | SYSTOLIC BLOOD PRESSURE: 122 MMHG

## 2019-11-05 DIAGNOSIS — F41.9 ANXIETY: ICD-10-CM

## 2019-11-05 PROCEDURE — 99213 OFFICE O/P EST LOW 20 MIN: CPT | Performed by: FAMILY MEDICINE

## 2019-11-05 RX ORDER — FLUOXETINE HYDROCHLORIDE 40 MG/1
40 CAPSULE ORAL DAILY
Qty: 90 CAPSULE | Refills: 3 | Status: SHIPPED | OUTPATIENT
Start: 2019-11-05 | End: 2021-01-05

## 2019-11-05 NOTE — PROGRESS NOTES
Antonieta Hill is a 39 y.o. female.     Chief Complaint   Patient presents with   • Anxiety     patient is following up on increased prozac.   • Herpes Zoster     patient si following up on blood work results.       HPI     Patient here to follow-up on anxiety.  Taking and tolerating an increased dose of Prozac 40 mg daily.  No adverse side effects noted.  Good symptom control.    The following portions of the patient's history were reviewed and updated as appropriate: allergies, current medications, past family history, past medical history, past social history, past surgical history and problem list.    Review of Systems   Skin: Positive for rash.   Psychiatric/Behavioral: Positive for sleep disturbance. The patient is nervous/anxious.    All other systems reviewed and are negative.    I have reviewed the ROS as documented by the MA. Ron Berkowitz MD    Objective  Vitals:    11/05/19 0823   BP: 122/60   Pulse: 70   Resp: 16   Temp: 98.3 °F (36.8 °C)   SpO2: 98%     Body mass index is 22.27 kg/m².    Physical Exam   Constitutional: She is oriented to person, place, and time. She appears well-developed and well-nourished. No distress.   HENT:   Head: Normocephalic and atraumatic.   Right Ear: External ear normal.   Left Ear: External ear normal.   Nose: Nose normal.   Mouth/Throat: Oropharynx is clear and moist.   Eyes: Conjunctivae and EOM are normal. Pupils are equal, round, and reactive to light. Right eye exhibits no discharge. Left eye exhibits no discharge. No scleral icterus.   Cardiovascular: Normal rate, regular rhythm and normal heart sounds.   Pulmonary/Chest: Effort normal and breath sounds normal. No respiratory distress. She has no wheezes. She has no rales.   Abdominal: Soft. Bowel sounds are normal. She exhibits no distension. There is no tenderness.   Neurological: She is alert and oriented to person, place, and time.   Skin: Skin is warm and dry. She is not diaphoretic.   Nursing note and  vitals reviewed.        Current Outpatient Medications:   •  FLUoxetine (PROzac) 40 MG capsule, Take 1 capsule by mouth Daily., Disp: 90 capsule, Rfl: 3  •  levothyroxine (SYNTHROID, LEVOTHROID) 100 MCG tablet, TAKE 1 TABLET BY MOUTH DAILY (Patient taking differently: Take 50 mcg by mouth Daily.), Disp: 90 tablet, Rfl: 0  •  spironolactone (ALDACTONE) 100 MG tablet, TAKE 1 TABLET BY MOUTH EVERY DAY (Patient taking differently: TAKE 1/2 TABLET BY MOUTH EVERY DAY), Disp: 30 tablet, Rfl: 0  Current outpatient and discharge medications have been reconciled for the patient.  Reviewed by: Ron Berkowitz MD      Procedures    Lab Results (most recent)     None                  Antonieta was seen today for anxiety and herpes zoster.    Diagnoses and all orders for this visit:    Anxiety  -     FLUoxetine (PROzac) 40 MG capsule; Take 1 capsule by mouth Daily.      Continue current therapy.  90-day supply given.    Return for As needed.      Ron Berkowitz MD

## 2019-11-11 RX ORDER — SPIRONOLACTONE 100 MG/1
TABLET, FILM COATED ORAL
Qty: 30 TABLET | Refills: 0 | Status: SHIPPED | OUTPATIENT
Start: 2019-11-11 | End: 2020-01-20

## 2019-12-16 RX ORDER — SPIRONOLACTONE 100 MG/1
TABLET, FILM COATED ORAL
Qty: 30 TABLET | Refills: 0 | Status: SHIPPED | OUTPATIENT
Start: 2019-12-16 | End: 2020-02-18

## 2019-12-18 DIAGNOSIS — F41.9 ANXIETY: ICD-10-CM

## 2019-12-18 RX ORDER — FLUOXETINE HYDROCHLORIDE 40 MG/1
40 CAPSULE ORAL DAILY
Qty: 90 CAPSULE | Refills: 3 | Status: SHIPPED | OUTPATIENT
Start: 2019-12-18 | End: 2020-12-17 | Stop reason: SDUPTHER

## 2020-01-20 RX ORDER — SPIRONOLACTONE 100 MG/1
TABLET, FILM COATED ORAL
Qty: 30 TABLET | Refills: 11 | Status: SHIPPED | OUTPATIENT
Start: 2020-01-20 | End: 2021-01-05

## 2020-02-18 RX ORDER — SPIRONOLACTONE 100 MG/1
TABLET, FILM COATED ORAL
Qty: 90 TABLET | Refills: 3 | Status: SHIPPED | OUTPATIENT
Start: 2020-02-18 | End: 2021-04-30 | Stop reason: SDUPTHER

## 2020-02-21 ENCOUNTER — LAB (OUTPATIENT)
Dept: ENDOCRINOLOGY | Age: 40
End: 2020-02-21

## 2020-02-21 DIAGNOSIS — E04.0 SIMPLE GOITER: ICD-10-CM

## 2020-02-21 DIAGNOSIS — E03.9 ACQUIRED HYPOTHYROIDISM: ICD-10-CM

## 2020-02-21 LAB
25(OH)D3+25(OH)D2 SERPL-MCNC: 27.9 NG/ML (ref 30–100)
BUN SERPL-MCNC: 14 MG/DL (ref 6–20)
BUN/CREAT SERPL: 15.7 (ref 7–25)
CALCIUM SERPL-MCNC: 9.4 MG/DL (ref 8.6–10.5)
CHLORIDE SERPL-SCNC: 98 MMOL/L (ref 98–107)
CO2 SERPL-SCNC: 27.7 MMOL/L (ref 22–29)
CREAT SERPL-MCNC: 0.89 MG/DL (ref 0.57–1)
FOLATE SERPL-MCNC: 10.9 NG/ML (ref 4.78–24.2)
GLUCOSE SERPL-MCNC: 96 MG/DL (ref 65–99)
POTASSIUM SERPL-SCNC: 4.3 MMOL/L (ref 3.5–5.2)
SODIUM SERPL-SCNC: 137 MMOL/L (ref 136–145)
T4 FREE SERPL-MCNC: 1.54 NG/DL (ref 0.93–1.7)
TSH SERPL DL<=0.005 MIU/L-ACNC: 2.61 UIU/ML (ref 0.27–4.2)
VIT B12 SERPL-MCNC: 312 PG/ML (ref 211–946)

## 2020-03-24 DIAGNOSIS — E03.9 HYPOTHYROIDISM, UNSPECIFIED TYPE: ICD-10-CM

## 2020-03-24 RX ORDER — LEVOTHYROXINE SODIUM 0.1 MG/1
100 TABLET ORAL DAILY
Qty: 90 TABLET | Refills: 0 | Status: SHIPPED | OUTPATIENT
Start: 2020-03-24 | End: 2020-06-25

## 2020-06-15 ENCOUNTER — TELEPHONE (OUTPATIENT)
Dept: FAMILY MEDICINE CLINIC | Facility: CLINIC | Age: 40
End: 2020-06-15

## 2020-06-15 NOTE — TELEPHONE ENCOUNTER
PATIENT CALLED AND WOULD LIKE TO KNOW IF SHE CAN HAVE AN ANTIBODY COVID-19 TEST DONE. SHE STATES THAT SHE HAD COVID-19 IN February BUT SHE WAS TESTED FOR THE FLU AND FOR STREP BUT TESTED NEGATIVE EVEN THOUGH SHE HAD ALL OF THE SYMPTOMS. PLEASE ADVISE.     PATIENT CALL BACK [phone]

## 2020-06-18 NOTE — TELEPHONE ENCOUNTER
Informed pt that as of right now our providers cannot say that any antibody testing is reliable, pt agreeable

## 2020-06-25 DIAGNOSIS — E03.9 HYPOTHYROIDISM, UNSPECIFIED TYPE: ICD-10-CM

## 2020-06-25 RX ORDER — LEVOTHYROXINE SODIUM 0.1 MG/1
100 TABLET ORAL DAILY
Qty: 90 TABLET | Refills: 0 | Status: SHIPPED | OUTPATIENT
Start: 2020-06-25 | End: 2020-10-29 | Stop reason: SDUPTHER

## 2020-10-13 DIAGNOSIS — E03.9 HYPOTHYROIDISM, UNSPECIFIED TYPE: ICD-10-CM

## 2020-10-13 RX ORDER — LEVOTHYROXINE SODIUM 0.1 MG/1
100 TABLET ORAL DAILY
Qty: 30 TABLET | Refills: 0 | Status: CANCELLED | OUTPATIENT
Start: 2020-10-13

## 2020-10-27 ENCOUNTER — TELEPHONE (OUTPATIENT)
Dept: FAMILY MEDICINE CLINIC | Facility: CLINIC | Age: 40
End: 2020-10-27

## 2020-10-27 NOTE — TELEPHONE ENCOUNTER
PATIENT IS RETURNING A CALL THAT SHE MISSED TODAY 10-27-20      PLEASE CONTACT PATIENT @[phone]

## 2020-10-29 DIAGNOSIS — E03.9 HYPOTHYROIDISM, UNSPECIFIED TYPE: ICD-10-CM

## 2020-10-29 NOTE — TELEPHONE ENCOUNTER
PATIENT CALLED IN REFERENCE TO HER MED REFILL OF    levothyroxine (SYNTHROID, LEVOTHROID) 100 MCG tablet    SHE HAS BEEN OUT OF MEDICATION FOR 2 WEEKS    SHE HAS CALLED EARLIER AND NOT AT HER PHARMACY    [id number] - [place], [place] - [address] AT [facility] - [phone]  - [phone]   [phone]    CALL BACK NUMBER [phone]

## 2020-10-30 RX ORDER — LEVOTHYROXINE SODIUM 0.1 MG/1
100 TABLET ORAL DAILY
Qty: 90 TABLET | Refills: 0 | Status: SHIPPED | OUTPATIENT
Start: 2020-10-30 | End: 2021-01-05 | Stop reason: SDUPTHER

## 2020-10-30 NOTE — TELEPHONE ENCOUNTER
MS. GARCIA SAYS THAT SHE HAS CALLED EVERYDAY THIS WEEK TO GET A REFILL OF HER LEVOTHYROXINE. SHE HAS NOT TAKEN HER MEDICINE IN 2 1/2 WEEKS.         SHE IS WANTING A CALL BACK TODAY.   LizAntonieta cunningham (Self) [phone]      [id number] [id number] [place] [address] AT [facility] & [address] - [phone]  - [phone]   [phone]        levothyroxine (SYNTHROID, LEVOTHROID) 100 MCG tablet  100 mcg, Daily 0 ordered  EditCancel Reorder       Summary: TAKE 1 TABLET BY MOUTH DAILY, Starting Thu 6/25/2020, Normal

## 2020-12-17 DIAGNOSIS — F41.9 ANXIETY: ICD-10-CM

## 2020-12-17 RX ORDER — FLUOXETINE HYDROCHLORIDE 40 MG/1
40 CAPSULE ORAL DAILY
Qty: 90 CAPSULE | Refills: 3 | Status: SHIPPED | OUTPATIENT
Start: 2020-12-17 | End: 2021-12-20

## 2021-01-05 ENCOUNTER — OFFICE VISIT (OUTPATIENT)
Dept: FAMILY MEDICINE CLINIC | Facility: CLINIC | Age: 41
End: 2021-01-05

## 2021-01-05 VITALS
SYSTOLIC BLOOD PRESSURE: 106 MMHG | DIASTOLIC BLOOD PRESSURE: 68 MMHG | HEIGHT: 70 IN | HEART RATE: 74 BPM | OXYGEN SATURATION: 98 % | WEIGHT: 168 LBS | BODY MASS INDEX: 24.05 KG/M2 | TEMPERATURE: 97.1 F

## 2021-01-05 DIAGNOSIS — T24.232D PARTIAL THICKNESS BURN OF LEFT LOWER LEG, SUBSEQUENT ENCOUNTER: ICD-10-CM

## 2021-01-05 DIAGNOSIS — T25.222D PARTIAL THICKNESS BURN OF LEFT FOOT, SUBSEQUENT ENCOUNTER: ICD-10-CM

## 2021-01-05 DIAGNOSIS — E03.9 HYPOTHYROIDISM, UNSPECIFIED TYPE: ICD-10-CM

## 2021-01-05 DIAGNOSIS — T22.212D PARTIAL THICKNESS BURN OF LEFT FOREARM, SUBSEQUENT ENCOUNTER: Primary | ICD-10-CM

## 2021-01-05 PROBLEM — T22.212A SECOND DEGREE BURN OF LEFT FOREARM: Status: ACTIVE | Noted: 2021-01-05

## 2021-01-05 PROBLEM — T24.232A SECOND DEGREE BURN OF LEFT LOWER LEG: Status: ACTIVE | Noted: 2021-01-05

## 2021-01-05 PROBLEM — T25.222A SECOND DEGREE BURN OF LEFT FOOT: Status: ACTIVE | Noted: 2021-01-05

## 2021-01-05 PROCEDURE — 99213 OFFICE O/P EST LOW 20 MIN: CPT | Performed by: NURSE PRACTITIONER

## 2021-01-05 RX ORDER — CLOBETASOL PROPIONATE 0.46 MG/ML
SOLUTION TOPICAL
COMMUNITY
Start: 2020-11-09

## 2021-01-05 RX ORDER — METRONIDAZOLE 7.5 MG/G
GEL VAGINAL
COMMUNITY
Start: 2020-11-01 | End: 2022-04-18

## 2021-01-05 RX ORDER — DIPHENOXYLATE HYDROCHLORIDE AND ATROPINE SULFATE 2.5; .025 MG/1; MG/1
1 TABLET ORAL DAILY
COMMUNITY

## 2021-01-05 RX ORDER — LEVOTHYROXINE SODIUM 0.1 MG/1
100 TABLET ORAL DAILY
Qty: 90 TABLET | Refills: 0 | Status: SHIPPED | OUTPATIENT
Start: 2021-01-05 | End: 2021-04-23
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
## 2021-03-03 ENCOUNTER — OFFICE VISIT (OUTPATIENT)
Dept: FAMILY MEDICINE CLINIC | Facility: CLINIC | Age: 41
End: 2021-03-03

## 2021-03-03 VITALS
SYSTOLIC BLOOD PRESSURE: 110 MMHG | HEART RATE: 90 BPM | WEIGHT: 154 LBS | BODY MASS INDEX: 22.05 KG/M2 | DIASTOLIC BLOOD PRESSURE: 68 MMHG | TEMPERATURE: 97.7 F | HEIGHT: 70 IN | OXYGEN SATURATION: 99 %

## 2021-03-03 DIAGNOSIS — Z13.0 SCREENING FOR IRON DEFICIENCY ANEMIA: ICD-10-CM

## 2021-03-03 DIAGNOSIS — Z00.00 ROUTINE GENERAL MEDICAL EXAMINATION AT A HEALTH CARE FACILITY: ICD-10-CM

## 2021-03-03 DIAGNOSIS — Z13.1 SCREENING FOR DIABETES MELLITUS: ICD-10-CM

## 2021-03-03 DIAGNOSIS — Z00.00 PHYSICAL EXAM, ROUTINE: Primary | ICD-10-CM

## 2021-03-03 DIAGNOSIS — Z13.6 SCREENING FOR ISCHEMIC HEART DISEASE: ICD-10-CM

## 2021-03-03 LAB
BILIRUB BLD-MCNC: NEGATIVE MG/DL
CLARITY, POC: CLEAR
COLOR UR: YELLOW
GLUCOSE UR STRIP-MCNC: NEGATIVE MG/DL
KETONES UR QL: NEGATIVE
LEUKOCYTE EST, POC: NEGATIVE
NITRITE UR-MCNC: NEGATIVE MG/ML
PH UR: 5 [PH] (ref 5–8)
PROT UR STRIP-MCNC: NEGATIVE MG/DL
RBC # UR STRIP: NEGATIVE /UL
SP GR UR: 1.02 (ref 1–1.03)
UROBILINOGEN UR QL: NORMAL

## 2021-03-03 PROCEDURE — 81003 URINALYSIS AUTO W/O SCOPE: CPT | Performed by: NURSE PRACTITIONER

## 2021-03-03 PROCEDURE — 99396 PREV VISIT EST AGE 40-64: CPT | Performed by: NURSE PRACTITIONER

## 2021-03-03 NOTE — PROGRESS NOTES
"Chief Complaint  Annual Exam (physcial - fasting)    Subjective          Antonieta Hill presents to [facility]  History of Present Illness  Well Adult Physical: Patient here for a comprehensive physical exam.The patient reports no problems  Do you take any herbs or supplements that were not prescribed by a doctor? no Are you taking calcium supplements? no Are you taking aspirin daily? no      Review of Systems   All other systems reviewed and are negative.    Objective   Vital Signs:   /68 (BP Location: Right arm, Patient Position: Sitting)   Pulse 90   Temp 97.7 °F (36.5 °C)   Ht 177.8 cm (70\")   Wt 69.9 kg (154 lb)   SpO2 99%   BMI 22.10 kg/m²     Physical Exam  Vitals signs reviewed.   Constitutional:       General: She is not in acute distress.     Appearance: She is well-developed. She is not diaphoretic.   HENT:      Head: Normocephalic and atraumatic. Hair is normal.      Right Ear: Hearing, tympanic membrane, ear canal and external ear normal. No decreased hearing noted. No drainage.      Left Ear: Hearing, tympanic membrane, ear canal and external ear normal. No decreased hearing noted.      Nose: No nasal deformity.   Eyes:      General: Lids are normal.         Right eye: No discharge.         Left eye: No discharge.      Conjunctiva/sclera: Conjunctivae normal.      Pupils: Pupils are equal, round, and reactive to light.   Neck:      Musculoskeletal: Normal range of motion and neck supple.      Thyroid: No thyromegaly.      Vascular: No JVD.      Trachea: No tracheal deviation.   Cardiovascular:      Rate and Rhythm: Normal rate and regular rhythm.      Pulses: Normal pulses.      Heart sounds: Normal heart sounds. No murmur. No friction rub. No gallop.    Pulmonary:      Effort: Pulmonary effort is normal. No respiratory distress.      Breath sounds: Normal breath sounds. No wheezing or rales.   Chest:      Chest wall: No tenderness.   Abdominal:      " General: Bowel sounds are normal. There is no distension.      Palpations: Abdomen is soft. There is no mass.      Tenderness: There is no abdominal tenderness. There is no guarding or rebound.      Hernia: No hernia is present.   Musculoskeletal: Normal range of motion.         General: No tenderness or deformity.   Lymphadenopathy:      Cervical: No cervical adenopathy.   Skin:     General: Skin is warm and dry.      Findings: No erythema or rash.   Neurological:      Mental Status: She is alert and oriented to person, place, and time.      Cranial Nerves: No cranial nerve deficit.      Motor: No abnormal muscle tone.      Coordination: Coordination normal.      Deep Tendon Reflexes: Reflexes are normal and symmetric. Reflexes normal.   Psychiatric:         Behavior: Behavior normal.         Thought Content: Thought content normal.         Judgment: Judgment normal.        Result Review :                 Assessment and Plan    Diagnoses and all orders for this visit:    1. Physical exam, routine (Primary)  -     POCT urinalysis dipstick, automated    2. Routine general medical examination at a health care facility    3. Screening for iron deficiency anemia  -     CBC & Differential    4. Screening for diabetes mellitus  -     Comprehensive Metabolic Panel    5. Screening for ischemic heart disease  -     Lipid Panel With LDL / HDL Ratio        Follow Up   Return if symptoms worsen or fail to improve.  Patient was given instructions and counseling regarding her condition or for health maintenance advice. Please see specific information pulled into the AVS if appropriate.     Discussed weight, diet and exercise  Follow up after labs    Mask and googles worn

## 2021-03-04 LAB
ALBUMIN SERPL-MCNC: 4.3 G/DL (ref 3.8–4.8)
ALBUMIN/GLOB SERPL: 2 {RATIO} (ref 1.2–2.2)
ALP SERPL-CCNC: 53 IU/L (ref 39–117)
ALT SERPL-CCNC: 12 IU/L (ref 0–32)
AST SERPL-CCNC: 14 IU/L (ref 0–40)
BASOPHILS # BLD AUTO: 0.1 X10E3/UL (ref 0–0.2)
BASOPHILS NFR BLD AUTO: 1 %
BILIRUB SERPL-MCNC: 0.6 MG/DL (ref 0–1.2)
BUN SERPL-MCNC: 16 MG/DL (ref 6–24)
BUN/CREAT SERPL: 19 (ref 9–23)
CALCIUM SERPL-MCNC: 9.2 MG/DL (ref 8.7–10.2)
CHLORIDE SERPL-SCNC: 103 MMOL/L (ref 96–106)
CHOLEST SERPL-MCNC: 207 MG/DL (ref 100–199)
CO2 SERPL-SCNC: 23 MMOL/L (ref 20–29)
CREAT SERPL-MCNC: 0.86 MG/DL (ref 0.57–1)
EOSINOPHIL # BLD AUTO: 0.2 X10E3/UL (ref 0–0.4)
EOSINOPHIL NFR BLD AUTO: 5 %
ERYTHROCYTE [DISTWIDTH] IN BLOOD BY AUTOMATED COUNT: 12.2 % (ref 11.7–15.4)
GLOBULIN SER CALC-MCNC: 2.2 G/DL (ref 1.5–4.5)
GLUCOSE SERPL-MCNC: 91 MG/DL (ref 65–99)
HCT VFR BLD AUTO: 38.2 % (ref 34–46.6)
HDLC SERPL-MCNC: 69 MG/DL
HGB BLD-MCNC: 12.5 G/DL (ref 11.1–15.9)
IMM GRANULOCYTES # BLD AUTO: 0 X10E3/UL (ref 0–0.1)
IMM GRANULOCYTES NFR BLD AUTO: 0 %
LDLC SERPL CALC-MCNC: 128 MG/DL (ref 0–99)
LDLC/HDLC SERPL: 1.9 RATIO (ref 0–3.2)
LYMPHOCYTES # BLD AUTO: 1.4 X10E3/UL (ref 0.7–3.1)
LYMPHOCYTES NFR BLD AUTO: 30 %
MCH RBC QN AUTO: 30 PG (ref 26.6–33)
MCHC RBC AUTO-ENTMCNC: 32.7 G/DL (ref 31.5–35.7)
MCV RBC AUTO: 92 FL (ref 79–97)
MONOCYTES # BLD AUTO: 0.4 X10E3/UL (ref 0.1–0.9)
MONOCYTES NFR BLD AUTO: 9 %
NEUTROPHILS # BLD AUTO: 2.6 X10E3/UL (ref 1.4–7)
NEUTROPHILS NFR BLD AUTO: 55 %
PLATELET # BLD AUTO: 295 X10E3/UL (ref 150–450)
POTASSIUM SERPL-SCNC: 4.8 MMOL/L (ref 3.5–5.2)
PROT SERPL-MCNC: 6.5 G/DL (ref 6–8.5)
RBC # BLD AUTO: 4.17 X10E6/UL (ref 3.77–5.28)
SODIUM SERPL-SCNC: 139 MMOL/L (ref 134–144)
TRIGL SERPL-MCNC: 58 MG/DL (ref 0–149)
VLDLC SERPL CALC-MCNC: 10 MG/DL (ref 5–40)
WBC # BLD AUTO: 4.7 X10E3/UL (ref 3.4–10.8)

## 2021-03-05 ENCOUNTER — TELEPHONE (OUTPATIENT)
Dept: FAMILY MEDICINE CLINIC | Facility: CLINIC | Age: 41
End: 2021-03-05

## 2021-03-05 DIAGNOSIS — E03.9 HYPOTHYROIDISM, UNSPECIFIED TYPE: Primary | ICD-10-CM

## 2021-03-05 LAB
Lab: NORMAL
Lab: NORMAL

## 2021-03-05 NOTE — TELEPHONE ENCOUNTER
DATE OF PROCEDURE:  3/22/2018    ATTENDING SURGEON: Surgeon(s) and Role:     * Jennifer David MD - Primary     FIRST ASSISTANT: Khurram Payne PA-C     No resident or fellow was available throughout the entire procedure as a result it was medically necessary for Khurram Payne PA-C to perform first assistant duties.      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    COMPLICATIONS: NONE    URINE OUTPUT: 0 mL    ESTIMATED BLOOD LOSS: 200 mL    CONDITION:  Good      INDICATIONS FOR OPERATIVE PROCEDURES:  Sanjeev Gipson is a 71 y.o.  male with history of left knee pain and pathology. He noted significant problems in the area of concern with problems on activities of daily living and aggressive use of the right leg. As a result of these problems and problems with overall   activity level, the patient was deemed to be an appropriate candidate for   operative intervention. There was significant genu varus noted and based on the patient's age and functional level, the patient was deemed to be an appropriate candidate for use of Conformis Implant products.    IMPLANTS UTILIZED:   ConforMIS tibial femoral implant  ConforMIS total tibial tray  ConforMIS 6 mm medial insert  ConforMIS lateral A insert  ConforMIS 41mm x 10 mm patellar component  Smartset Gentamicin bone Cement    FINDINGS:     ARTICULAR CARTILAGE LESION(S):  Medial Femoral Condyle: ICRS Grade 4A      Size: 3 x 6 cm  Medial tibial plateau: ICRS Grade 4A      Size: 3.0 x 3.5 cm        Lateral Femoral Condyle: ICRS Grade 3      Size: 3.0 x 3.0  Spoke to patient about her lab results she thought you were going to order a tsh because she is on levothyroxine?   cm  Lateral tibial plateau: ICRS Grade 4A      Size: 3.0 x 3.0 cm        Patellar surface: ICRS Grade 3      Size: 2.5 x 2.5 cm  Trochlear groove: ICRS Grade 4A      Size: 2.5 x 3.0 cm    EXAMINATION UNDER ANESTHESIA:   Extension 0 degrees  Flexion 120 degrees  Lachman Maneuver:  Negative  Anterior Drawer: Negative  Posterior Drawer:  Negative    DESCRIPTION OF PROCEDURE:   The patient was brought into the Operating Room and placed in supine position. Upon application of femoral block in the preoperative holding area, the patient underwentGeneral to stabilize the area. The patient was given the appropriate dose of antibiotics based on body weight. Timeout was utilized to verify the RIGHT LEFT BILATERAL:01155} side as the operative side. Both upper extremities were placed in comfortable position. The nonoperative leg was carefully padded along the heel and peroneal nerve regions. Veronica catheter was applied. Operative leg was then prepped and draped in a sterile fashion with ChloraPrep material with a bump under the right hip and popliteal post along the right side of the table. Once prepped and draped in sterile fashion, Coban was placed on the knee. A medial based incision was carried down the skin down to fat and fascia. A medial subvastus approach was performed and the patella was subluxed lateral  Flaps were raised superiorly and inferiorly as well as medially and laterally along the region of concern.      Attention was turned to the distal femur and the # 1 preparation device from ConforMIS was used to create the distal lug holes for the implant and # 2 guide. We then   drilled the distal femoral region as medially and laterally along the region of   concern. We placed a # 2 cutting block, which was applied made the distal   pin holes, which were then removed simultaneously and drilled proximal pin holes and pins to stabilize the distal femoral cutting block. This was left in place and the saw blade used to  create the distal femoral cut. Bone was removed. We then   assessed our cut with a #3 guide from the ConforMIS set. The cut was found to   be excellent. As a result, we turned our attention to the proximal tibia.  ACL structure was resected. The PCL structure was recessed. Medial and lateral  meniscus remnants were removed with the Bovie cautery. We then applied the # 4   cutting system directly over the area of concern. We used to currettes to remove the cartilage from the proximal tibia down to the subchondral bone level. Extramedullary alignment lu was used to verify appropriate alignment. The cutting guide was pinned into position and an oscillating saw used, we made the proximal cut. We used the # 3 guide to assess our extension gap and the # 6 guide to assess our flexion gap. There gaps were symmetric flexion and extension gaps with appropriate alingment as well.    With this performed, we then visualized the distal femus once again and placed the # 7 femoral cutting guide into position and pinned this with the appropriate distal femoral rotation. The previously placed pin holes along the distal femur were used to position this cutting guide and the oblique pin holes created and the pins placed to hold the guide in the appropriated rotation. The central pins were removed. Anterior and posterior condylar and chamfer cuts were created as well as the distal lug holes for the femoral component. The # 8/9 femoral guide was applied and the final posterior chamfer cuts were created. We placed the trial femoral and tibial components demonstrating great stability in flexion and extension with varus and valgus load as well as great flexion without shift of the tibial component.    We re-exposed the proximal tibia, placed the preparation tray for the tibial   Region and pinned this into position. The central tower was applied and the proximal tibia drilled centrally followed by application of the trial keel. We then  removed the trial keel.We exposed the patella, sized the patella, demonstrating the 41 mm patellar component would most normalize the patient's anatomy. As a result, we removed approximately 10 mm of bone, drilled 3 peg holes and placed the trial patella fit in excellent fashion. The knee was taken through range of motion demonstrating excellent tracking and stability. As a result, trial components were left in place. An Esmarch was then used to exsanguinate the limb. Tourniquet was inflated. Trial components were removed. We exposed the femoral, proximal tibial plateau and patellar regions. Pulsavac was used to remove all blood elements and the areas dried and prepared for cementing. We then mixed cement and placed cement first at the tibial region and placed the tibial component position and extruded cement was removed. We then turned our attention to the femoral and patellar regions. These dried areas were then cemented with extruded cement removed from the femoral component as well as   applied cement to the patellar component. The we then performed trial reduction with the trial inserts applied. Knee was taken to extension demonstrating excellent stability with no signs of hyperextension remaining. The tourniquet was released and all bleeding sites were cauterized. Final pulsavac lavage was performed with application of 3 liters of fluid through the knee. Trial components were removed and the actual 6 mm medial insert was applied along with the size A lateral insert. Prior to placement of these inserts Exparel mixture was applied to the posterior capsular, medial subvastus region and anterior fatpad regions of the knee with a 20 gauge spinal needle. Further irrigation performed along the area of concern. A drain was taken out of the lateral aspect of the knee. Following placement of the drain, we then closed the synovial layer and parapatellar tendon region with a series of #1 Vicryl sutures placed in  figure-of-eight fashion. We then closed subcutaneous tissues with 3-0 Vicryl sutures placed in inverted fashion. Skin was closed with running 4-0 Monocryl sutures placed in subcuticular fashion along with application of Dermabond ointment and tape. We did apply intraarticular Exparel for postoperative pain control. We applied Xeroform gauze, ABD pads, cast padding, sterile electrode pads proximally and distally, a long-leg SONAL hose stocking and cooling unit. The patient was allowed to recover from the anesthetic. was removed. The patient was taken to Recovery Room in stable condition. At the completion case, all instrument and sponge counts were correct. Subcutaneous tissues were closed with 3-0 Vicryl sutures placed in inverted fashion. Skin was closed with running 4-0 Monocryl sutures placed in subcuticular fashion along with application of Dermabond ointment and Dermabond tape. We then applied Xeroform gauze, ABD pads, cast padding, a long-leg SONAL hose stocking and cooling unit. The patient was allowed to recover from the anesthetic. LMA was removed. The patient was taken to Recovery Room in stable condition. At the completion of the case, all instrument and sponge counts were correct.    NOTE: I was present and scrubbed for the key portions of the procedure.    PHYSICAL THERAPY:  The patient should begin physical therapy on postoperative   day #3 and will be advanced to outpatient therapy as soon as   Possible following discharge.    Weight bearing:as tolerated  left leg  Range of Motion:Full normal motion symmetric to opposite side    Continuous passive motion (CPM) machine start on   postop day # 0 at  10 degrees hyperextension to 120 degrees flexion as tolerated for 6-8 hours per day for 4 weeks.     The patient is to be taken out of the continuous passive motion (CPM)  machine as much as possible and begin active assisted range of motion programs.    Immediate specific exercises should include:extension  exercises, quadriceps load with a heel roll, prone hang procedures with 5-10 lbs. ankle weights.    Gait program should include: active extension with a heel-to-toe gait working on maintaining extension    Discharge home when stable  Follow-up as scheduled  Condition Stable

## 2021-03-11 LAB
TSH SERPL DL<=0.005 MIU/L-ACNC: 3.73 UIU/ML (ref 0.45–4.5)
WRITTEN AUTHORIZATION: NORMAL

## 2021-04-22 DIAGNOSIS — E03.9 HYPOTHYROIDISM, UNSPECIFIED TYPE: ICD-10-CM

## 2021-04-23 RX ORDER — LEVOTHYROXINE SODIUM 0.1 MG/1
100 TABLET ORAL DAILY
Qty: 90 TABLET | Refills: 3 | Status: SHIPPED | OUTPATIENT
Start: 2021-04-23 | End: 2022-07-15 | Stop reason: SDUPTHER

## 2021-05-03 RX ORDER — SPIRONOLACTONE 100 MG/1
100 TABLET, FILM COATED ORAL DAILY
Qty: 90 TABLET | Refills: 3 | Status: SHIPPED | OUTPATIENT
Start: 2021-05-03 | End: 2022-07-15 | Stop reason: SDUPTHER

## 2021-12-19 DIAGNOSIS — F41.9 ANXIETY: ICD-10-CM

## 2021-12-20 RX ORDER — FLUOXETINE HYDROCHLORIDE 40 MG/1
40 CAPSULE ORAL DAILY
Qty: 90 CAPSULE | Refills: 3 | Status: SHIPPED | OUTPATIENT
Start: 2021-12-20 | End: 2023-01-26

## 2022-04-15 ENCOUNTER — TELEPHONE (OUTPATIENT)
Dept: FAMILY MEDICINE CLINIC | Facility: CLINIC | Age: 42
End: 2022-04-15

## 2022-04-15 RX ORDER — METHYLPREDNISOLONE 4 MG/1
TABLET ORAL
Qty: 1 EACH | Refills: 0 | Status: SHIPPED | OUTPATIENT
Start: 2022-04-15 | End: 2022-08-17

## 2022-04-15 NOTE — TELEPHONE ENCOUNTER
Caller: Antonieta Hill A    Relationship to patient: Self    Best call back number: [phone]    Patient is needing: PATIENT IS HAVING SCIATIC NERVE PAIN IN BOTH LEGS AND I SCHEDULED HER FOR AN APPOINTMENT ON 4-18-22, SHE IS WANTING TO KNOW IF SOME ANTI INFLAMMATORY OR MUSCLE RELAXER OR SOMETHING THAT WILL HELP COULD BE CALLED IN. SHE STATES SHE CAN NOT EVEN SLEEP, STANDING UP IS OK BUT STILL UNCOMFORTABLE BUT LAYING DOWN IS TERRIBLE.    [facility] #[id number] [facility] [address] [facility] AT [facility] - [phone] [place] [phone] [place] [phone]

## 2022-04-18 ENCOUNTER — OFFICE VISIT (OUTPATIENT)
Dept: FAMILY MEDICINE CLINIC | Facility: CLINIC | Age: 42
End: 2022-04-18

## 2022-04-18 VITALS
HEIGHT: 70 IN | WEIGHT: 153 LBS | HEART RATE: 64 BPM | OXYGEN SATURATION: 99 % | DIASTOLIC BLOOD PRESSURE: 70 MMHG | SYSTOLIC BLOOD PRESSURE: 116 MMHG | TEMPERATURE: 97.3 F | BODY MASS INDEX: 21.9 KG/M2

## 2022-04-18 DIAGNOSIS — Z13.0 SCREENING FOR IRON DEFICIENCY ANEMIA: ICD-10-CM

## 2022-04-18 DIAGNOSIS — F41.9 ANXIETY: ICD-10-CM

## 2022-04-18 DIAGNOSIS — Z13.6 SCREENING FOR ISCHEMIC HEART DISEASE: ICD-10-CM

## 2022-04-18 DIAGNOSIS — M54.41 ACUTE BILATERAL LOW BACK PAIN WITH BILATERAL SCIATICA: Primary | ICD-10-CM

## 2022-04-18 DIAGNOSIS — Z13.1 SCREENING FOR DIABETES MELLITUS: ICD-10-CM

## 2022-04-18 DIAGNOSIS — M54.42 ACUTE BILATERAL LOW BACK PAIN WITH BILATERAL SCIATICA: Primary | ICD-10-CM

## 2022-04-18 PROCEDURE — 72110 X-RAY EXAM L-2 SPINE 4/>VWS: CPT | Performed by: NURSE PRACTITIONER

## 2022-04-18 PROCEDURE — 99214 OFFICE O/P EST MOD 30 MIN: CPT | Performed by: NURSE PRACTITIONER

## 2022-04-18 RX ORDER — FLUOXETINE HYDROCHLORIDE 20 MG/1
20 CAPSULE ORAL DAILY
Qty: 90 CAPSULE | Refills: 1 | Status: SHIPPED | OUTPATIENT
Start: 2022-04-18 | End: 2022-07-15 | Stop reason: SDUPTHER

## 2022-04-18 NOTE — PROGRESS NOTES
"Chief Complaint  Pain (Pain that starts in tailbone and went down both legs to knees (pt felt like hamstrings were on fire) -this started last Tuesday. Pt says she was on a plane the Saturday before she started having pain )    Subjective          Antonieta Hill presents to [facility]  History of Present Illness  Patient presenting to the office today with concerns regarding bilateral leg burning down to her knees and central low back pain.  She does have a history of \"throwing her back out\" but this was completely different.  Started on Tuesday.  No injury that she is aware of she called in a Medrol Dosepak was sent in which has significantly helped her and been able to completely resolve her pain but at the time of the phone call into the office she was a pain scale of an 8.  Before Tuesday she had flown on Saturday she felt a little achy on Sunday and Monday but the pain really began on Tuesday.    Anxiety-patient is currently going through divorce and she is on Prozac 40 mg daily but if she is increased crying a lot to where she is at times crying at work and she is crying usually every day at home.  She is wondering if she can increase the dose or add something to help her get through this difficult time.    Objective   Vital Signs:   /70   Pulse 64   Temp 97.3 °F (36.3 °C)   Ht 177.8 cm (70\")   Wt 69.4 kg (153 lb)   SpO2 99%   BMI 21.95 kg/m²     BMI is within normal parameters. No follow-up required.      Physical Exam  Vitals reviewed.   Constitutional:       General: She is not in acute distress.     Appearance: She is well-developed.   HENT:      Head: Normocephalic.   Cardiovascular:      Rate and Rhythm: Normal rate and regular rhythm.      Heart sounds: Normal heart sounds.   Pulmonary:      Effort: Pulmonary effort is normal.      Breath sounds: Normal breath sounds.   Neurological:      Mental Status: She is alert and oriented to person, place, and time. "      Gait: Gait normal.   Psychiatric:         Behavior: Behavior normal.         Thought Content: Thought content normal.         Judgment: Judgment normal.        Result Review :   The following data was reviewed by: NIA Jesus on 04/18/2022:                         Xray- l spine    Findings- possible bulging disc at l4-l5  No comparison     Assessment and Plan    Diagnoses and all orders for this visit:    1. Acute bilateral low back pain with bilateral sciatica (Primary)  -     XR Spine Lumbar Complete 4+VW  -     Ambulatory Referral to Physical Therapy Evaluate and treat    2. Anxiety  -     FLUoxetine (PROzac) 20 MG capsule; Take 1 capsule by mouth Daily.  Dispense: 90 capsule; Refill: 1    3. Screening for iron deficiency anemia  -     CBC & Differential    4. Screening for diabetes mellitus  -     Comprehensive Metabolic Panel    5. Screening for ischemic heart disease  -     Lipid Panel With LDL / HDL Ratio        Follow Up   Return if symptoms worsen or fail to improve.  Patient was given instructions and counseling regarding her condition or for health maintenance advice. Please see specific information pulled into the AVS if appropriate.     Referral to physical therapy for back.  Increase Prozac to 60 mg give it at least 4 to 6 weeks return to the office if needed  Follow-up after labs  Mask and googles worn

## 2022-04-19 LAB
ALBUMIN SERPL-MCNC: 4.7 G/DL (ref 3.8–4.8)
ALBUMIN/GLOB SERPL: 2.2 {RATIO} (ref 1.2–2.2)
ALP SERPL-CCNC: 47 IU/L (ref 44–121)
ALT SERPL-CCNC: 19 IU/L (ref 0–32)
AST SERPL-CCNC: 16 IU/L (ref 0–40)
BASOPHILS # BLD AUTO: 0.1 X10E3/UL (ref 0–0.2)
BASOPHILS NFR BLD AUTO: 1 %
BILIRUB SERPL-MCNC: 0.7 MG/DL (ref 0–1.2)
BUN SERPL-MCNC: 19 MG/DL (ref 6–24)
BUN/CREAT SERPL: 23 (ref 9–23)
CALCIUM SERPL-MCNC: 9.7 MG/DL (ref 8.7–10.2)
CHLORIDE SERPL-SCNC: 101 MMOL/L (ref 96–106)
CHOLEST SERPL-MCNC: 234 MG/DL (ref 100–199)
CO2 SERPL-SCNC: 24 MMOL/L (ref 20–29)
CREAT SERPL-MCNC: 0.83 MG/DL (ref 0.57–1)
EGFRCR SERPLBLD CKD-EPI 2021: 90 ML/MIN/1.73
EOSINOPHIL # BLD AUTO: 0 X10E3/UL (ref 0–0.4)
EOSINOPHIL NFR BLD AUTO: 1 %
ERYTHROCYTE [DISTWIDTH] IN BLOOD BY AUTOMATED COUNT: 12.4 % (ref 11.7–15.4)
GLOBULIN SER CALC-MCNC: 2.1 G/DL (ref 1.5–4.5)
GLUCOSE SERPL-MCNC: 72 MG/DL (ref 65–99)
HCT VFR BLD AUTO: 38.7 % (ref 34–46.6)
HDLC SERPL-MCNC: 88 MG/DL
HGB BLD-MCNC: 12.8 G/DL (ref 11.1–15.9)
IMM GRANULOCYTES # BLD AUTO: 0 X10E3/UL (ref 0–0.1)
IMM GRANULOCYTES NFR BLD AUTO: 0 %
LDLC SERPL CALC-MCNC: 138 MG/DL (ref 0–99)
LDLC/HDLC SERPL: 1.6 RATIO (ref 0–3.2)
LYMPHOCYTES # BLD AUTO: 1.6 X10E3/UL (ref 0.7–3.1)
LYMPHOCYTES NFR BLD AUTO: 25 %
MCH RBC QN AUTO: 30.9 PG (ref 26.6–33)
MCHC RBC AUTO-ENTMCNC: 33.1 G/DL (ref 31.5–35.7)
MCV RBC AUTO: 94 FL (ref 79–97)
MONOCYTES # BLD AUTO: 0.4 X10E3/UL (ref 0.1–0.9)
MONOCYTES NFR BLD AUTO: 7 %
NEUTROPHILS # BLD AUTO: 4.3 X10E3/UL (ref 1.4–7)
NEUTROPHILS NFR BLD AUTO: 66 %
PLATELET # BLD AUTO: 321 X10E3/UL (ref 150–450)
POTASSIUM SERPL-SCNC: 4.8 MMOL/L (ref 3.5–5.2)
PROT SERPL-MCNC: 6.8 G/DL (ref 6–8.5)
RBC # BLD AUTO: 4.14 X10E6/UL (ref 3.77–5.28)
SODIUM SERPL-SCNC: 139 MMOL/L (ref 134–144)
TRIGL SERPL-MCNC: 47 MG/DL (ref 0–149)
VLDLC SERPL CALC-MCNC: 8 MG/DL (ref 5–40)
WBC # BLD AUTO: 6.4 X10E3/UL (ref 3.4–10.8)

## 2022-06-21 RX ORDER — SPIRONOLACTONE 100 MG/1
100 TABLET, FILM COATED ORAL DAILY
Qty: 90 TABLET | Refills: 3 | OUTPATIENT
Start: 2022-06-21

## 2022-07-02 DIAGNOSIS — E03.9 HYPOTHYROIDISM, UNSPECIFIED TYPE: ICD-10-CM

## 2022-07-05 RX ORDER — LEVOTHYROXINE SODIUM 0.1 MG/1
100 TABLET ORAL DAILY
Qty: 90 TABLET | Refills: 3 | OUTPATIENT
Start: 2022-07-05

## 2022-07-15 DIAGNOSIS — E03.9 HYPOTHYROIDISM, UNSPECIFIED TYPE: ICD-10-CM

## 2022-07-15 DIAGNOSIS — F41.9 ANXIETY: ICD-10-CM

## 2022-07-15 RX ORDER — SPIRONOLACTONE 100 MG/1
100 TABLET, FILM COATED ORAL DAILY
Qty: 30 TABLET | Refills: 0 | Status: SHIPPED | OUTPATIENT
Start: 2022-07-15 | End: 2022-07-18

## 2022-07-15 RX ORDER — LEVOTHYROXINE SODIUM 0.1 MG/1
100 TABLET ORAL DAILY
Qty: 30 TABLET | Refills: 0 | Status: SHIPPED | OUTPATIENT
Start: 2022-07-15 | End: 2022-07-18

## 2022-07-15 RX ORDER — FLUOXETINE HYDROCHLORIDE 20 MG/1
20 CAPSULE ORAL DAILY
Qty: 30 CAPSULE | Refills: 0 | Status: SHIPPED | OUTPATIENT
Start: 2022-07-15 | End: 2022-08-23

## 2022-07-15 NOTE — TELEPHONE ENCOUNTER
Caller: Antonieta Hill    Relationship: Self    Best call back number: [phone]    Requested Prescriptions:   Requested Prescriptions     Pending Prescriptions Disp Refills   • spironolactone (ALDACTONE) 100 MG tablet 90 tablet 3     Sig: Take 1 tablet by mouth Daily.   • levothyroxine (SYNTHROID, LEVOTHROID) 100 MCG tablet 90 tablet 3     Sig: Take 1 tablet by mouth Daily.   • FLUoxetine (PROzac) 20 MG capsule 90 capsule 1     Sig: Take 1 capsule by mouth Daily.        Pharmacy where request should be sent: [facility] #[id number] [facility] [address] [facility] AT [facility] [address] - [phone] [facility] [phone]      Additional details provided by patient: PATIENT STATES SHE IS COMPLETELY OUT OF THE SPIRONOLACTONE AND HAS 5 DAYS LEFT ON THE OTHER TWO PRESCRIPTIONS.     PATIENT IS REQUESTING A REFILL ON THESE PRESCRIPTIONS TO AT LEAST LAST UNTIL HER NEW PATIENT APPOINTMENT WITH EUNICE MATA ON 08/17/2022.     PLEASE CALL PATIENT TO LET HER KNOW IF SHE CAN RECEIVE THE REFILLS OR NOT.     Does the patient have less than a 3 day supply:  [x] Yes  [] No    Teressa Zazueta Rep   07/15/22 12:43 EDT

## 2022-07-15 NOTE — TELEPHONE ENCOUNTER
Rx Refill Note  Requested Prescriptions     Pending Prescriptions Disp Refills   • spironolactone (ALDACTONE) 100 MG tablet 30 tablet 0     Sig: Take 1 tablet by mouth Daily.   • levothyroxine (SYNTHROID, LEVOTHROID) 100 MCG tablet 30 tablet 0     Sig: Take 1 tablet by mouth Daily.   • FLUoxetine (PROzac) 20 MG capsule 30 capsule 0     Sig: Take 1 capsule by mouth Daily.      Last office visit with prescribing clinician: 4/18/2022      Next office visit with prescribing clinician: Visit date not found            Jorge Ramos MA  07/15/22, 12:54 EDT   oral

## 2022-07-18 RX ORDER — LEVOTHYROXINE SODIUM 0.1 MG/1
100 TABLET ORAL DAILY
Qty: 60 TABLET | Refills: 0 | Status: SHIPPED | OUTPATIENT
Start: 2022-07-18 | End: 2022-10-26

## 2022-07-18 RX ORDER — SPIRONOLACTONE 100 MG/1
100 TABLET, FILM COATED ORAL DAILY
Qty: 60 TABLET | Refills: 0 | Status: SHIPPED | OUTPATIENT
Start: 2022-07-18 | End: 2022-10-26

## 2022-07-18 NOTE — TELEPHONE ENCOUNTER
Rx Refill Note  Requested Prescriptions     Pending Prescriptions Disp Refills   • spironolactone (ALDACTONE) 100 MG tablet [Pharmacy Med Name: SPIRONOLACTONE 100MG TABLETS] 90 tablet      Sig: TAKE 1 TABLET BY MOUTH DAILY   • levothyroxine (SYNTHROID, LEVOTHROID) 100 MCG tablet [Pharmacy Med Name: LEVOTHYROXINE 0.100MG (100MCG) TAB] 90 tablet      Sig: TAKE 1 TABLET BY MOUTH DAILY      Last office visit with prescribing clinician: Visit date not found      Next office visit with prescribing clinician: Visit date not found            Jorge Ramos MA  07/18/22, 11:03 EDT

## 2022-08-17 ENCOUNTER — OFFICE VISIT (OUTPATIENT)
Dept: FAMILY MEDICINE CLINIC | Facility: CLINIC | Age: 42
End: 2022-08-17

## 2022-08-17 VITALS
OXYGEN SATURATION: 99 % | WEIGHT: 153 LBS | BODY MASS INDEX: 21.9 KG/M2 | DIASTOLIC BLOOD PRESSURE: 60 MMHG | SYSTOLIC BLOOD PRESSURE: 86 MMHG | HEART RATE: 82 BPM | TEMPERATURE: 97.5 F | HEIGHT: 70 IN

## 2022-08-17 DIAGNOSIS — Z00.00 ENCOUNTER FOR GENERAL HEALTH EXAMINATION: Primary | ICD-10-CM

## 2022-08-17 DIAGNOSIS — F41.9 ANXIETY: ICD-10-CM

## 2022-08-17 DIAGNOSIS — R42 EPISODIC LIGHTHEADEDNESS: ICD-10-CM

## 2022-08-17 DIAGNOSIS — E03.9 HYPOTHYROIDISM, UNSPECIFIED TYPE: ICD-10-CM

## 2022-08-17 PROCEDURE — 99396 PREV VISIT EST AGE 40-64: CPT | Performed by: STUDENT IN AN ORGANIZED HEALTH CARE EDUCATION/TRAINING PROGRAM

## 2022-08-17 NOTE — PROGRESS NOTES
"Chief Complaint  Hypothyroidism (NEW PATIENT ESTABLISH CARE YESTERDAY HAD A EPISODE OF SHAKINESS WHEN SHE LEFT SCHOOL MUCH BETTER TODAY) and Anxiety    Subjective        Antonieta Hill presents to [facility]  Presents to establish care and for annual exam.    Overall doing well.  Depression is well controlled on current dose of Prozac.  She recently had increased dose as she is going through a divorce.    She states she had an episode of shaking in her hands bilaterally yesterday around 3 PM.  This was associated with lightheadedness and \"feeling like she had too much coffee\".  She ate a snack and it did not improve.  No palpitations, vision changes, syncopal episodes, no increased anxiety, no diarrhea.      Objective   Vital Signs:  BP (!) 86/60 (BP Location: Right arm, Patient Position: Sitting, Cuff Size: Adult)   Pulse 82   Temp 97.5 °F (36.4 °C)   Ht 177.8 cm (70\")   Wt 69.4 kg (153 lb)   SpO2 99%   BMI 21.95 kg/m²   Estimated body mass index is 21.95 kg/m² as calculated from the following:    Height as of this encounter: 177.8 cm (70\").    Weight as of this encounter: 69.4 kg (153 lb).    BMI is within normal parameters. No other follow-up for BMI required.      Physical Exam  Constitutional:       General: She is not in acute distress.  Eyes:      Conjunctiva/sclera: Conjunctivae normal.   Cardiovascular:      Rate and Rhythm: Normal rate and regular rhythm.   Pulmonary:      Effort: Pulmonary effort is normal. No respiratory distress.      Breath sounds: Normal breath sounds.   Musculoskeletal:      Right lower leg: No edema.      Left lower leg: No edema.   Skin:     General: Skin is warm and dry.   Neurological:      Mental Status: She is alert and oriented to person, place, and time.   Psychiatric:         Mood and Affect: Mood normal.         Behavior: Behavior normal.        Result Review :  The following data was reviewed by: Grisel Kumar MD on " "08/17/2022:  Common labs    Common Labsle 4/18/22 4/18/22 4/18/22    [id number]   Glucose  72    BUN  19    Creatinine  0.83    Sodium  139    Potassium  4.8    Chloride  101    Calcium  9.7    Total Protein  6.8    Albumin  4.7    Total Bilirubin  0.7    Alkaline Phosphatase  47    AST (SGOT)  16    ALT (SGPT)  19    WBC 6.4     Hemoglobin 12.8     Hematocrit 38.7     Platelets 321     Total Cholesterol   234 (A)   Triglycerides   47   HDL Cholesterol   88   LDL Cholesterol    138 (A)   (A) Abnormal value            Data reviewed: none          Assessment and Plan   Diagnoses and all orders for this visit:    1. Encounter for general health examination (Primary)  Assessment & Plan:  Colonoscopy: No family history of colon cancer.  However, multiple family members have had colostomy bags in the past due to \" colon rupture\".  Unsure of etiology of rupture.  Start colon cancer screening at age 43.  Cervical Cancer Screening: Follows with OB GYN at [place].  Mammogram: Normal 8/2021.  Patient will call to schedule  LDCT: Never smoker  DEXA: Indicated at age 63    Immunizations: Up-to-date  Labs: Ordered today    Orders:  -     CBC Auto Differential  -     Comprehensive Metabolic Panel  -     Hemoglobin A1c    2. Hypothyroidism, unspecified type  Assessment & Plan:  Diagnosed with Hashimoto thyroiditis in 2018.  At that time symptoms were extreme tiredness.  TSH 8, TPO antibody mildly elevated.  Has been on 100 mcg of levothyroxine daily since that time.  Continue current dose pending TSH free T4 labs today.    Orders:  -     TSH  -     T4, free    3. Anxiety    4. Episodic lightheadedness  Assessment & Plan:  -Patient with episode of lightheadedness yesterday afternoon while at school.  This was associated with bilateral hand tremor.  No other associated symptoms such as vision changes, nausea or vomiting, chest pain, palpitations.  -Etiology: Hyperthyroidism (on Synthroid for Hashimoto thyroiditis), orthostatic " hypotension, adverse medication reaction  Unlikely to be hypoglycemia as did not improve with eating.    Plan: We will check TSH, CMP, A1c today.            I spent 40 minutes caring for Antonieta on this date of service. This time includes time spent by me in the following activities:preparing for the visit, reviewing tests, performing a medically appropriate examination and/or evaluation , ordering medications, tests, or procedures and documenting information in the medical record  Follow Up   Return in about 1 year (around 8/17/2023) for Annual physical.  Patient was given instructions and counseling regarding her condition or for health maintenance advice. Please see specific information pulled into the AVS if appropriate.

## 2022-08-17 NOTE — ASSESSMENT & PLAN NOTE
-Patient with episode of lightheadedness yesterday afternoon while at school.  This was associated with bilateral hand tremor.  No other associated symptoms such as vision changes, nausea or vomiting, chest pain, palpitations.  -Etiology: Hyperthyroidism (on Synthroid for Hashimoto thyroiditis), orthostatic hypotension, adverse medication reaction  Unlikely to be hypoglycemia as did not improve with eating.    Plan: We will check TSH, CMP, A1c today.

## 2022-08-17 NOTE — ASSESSMENT & PLAN NOTE
Diagnosed with Hashimoto thyroiditis in 2018.  At that time symptoms were extreme tiredness.  TSH 8, TPO antibody mildly elevated.  Has been on 100 mcg of levothyroxine daily since that time.  Continue current dose pending TSH free T4 labs today.

## 2022-08-17 NOTE — ASSESSMENT & PLAN NOTE
"Colonoscopy: No family history of colon cancer.  However, multiple family members have had colostomy bags in the past due to \" colon rupture\".  Unsure of etiology of rupture.  Start colon cancer screening at age 43.  Cervical Cancer Screening: Follows with OB GYN at [place].  Mammogram: Normal 8/2021.  Patient will call to schedule  LDCT: Never smoker  DEXA: Indicated at age 63    Immunizations: Up-to-date  Labs: Ordered today  "

## 2022-08-18 LAB
ALBUMIN SERPL-MCNC: 4.4 G/DL (ref 3.8–4.8)
ALBUMIN/GLOB SERPL: 2.4 {RATIO} (ref 1.2–2.2)
ALP SERPL-CCNC: 42 IU/L (ref 44–121)
ALT SERPL-CCNC: 17 IU/L (ref 0–32)
AST SERPL-CCNC: 19 IU/L (ref 0–40)
BASOPHILS # BLD AUTO: 0.1 X10E3/UL (ref 0–0.2)
BASOPHILS NFR BLD AUTO: 1 %
BILIRUB SERPL-MCNC: 0.5 MG/DL (ref 0–1.2)
BUN SERPL-MCNC: 16 MG/DL (ref 6–24)
BUN/CREAT SERPL: 17 (ref 9–23)
CALCIUM SERPL-MCNC: 9.4 MG/DL (ref 8.7–10.2)
CHLORIDE SERPL-SCNC: 101 MMOL/L (ref 96–106)
CO2 SERPL-SCNC: 24 MMOL/L (ref 20–29)
CREAT SERPL-MCNC: 0.92 MG/DL (ref 0.57–1)
EGFRCR-CYS SERPLBLD CKD-EPI 2021: 79 ML/MIN/1.73
EOSINOPHIL # BLD AUTO: 0.2 X10E3/UL (ref 0–0.4)
EOSINOPHIL NFR BLD AUTO: 5 %
ERYTHROCYTE [DISTWIDTH] IN BLOOD BY AUTOMATED COUNT: 12.2 % (ref 11.7–15.4)
GLOBULIN SER CALC-MCNC: 1.8 G/DL (ref 1.5–4.5)
GLUCOSE SERPL-MCNC: 95 MG/DL (ref 65–99)
HBA1C MFR BLD: 5.4 % (ref 4.8–5.6)
HCT VFR BLD AUTO: 37.4 % (ref 34–46.6)
HGB BLD-MCNC: 12.7 G/DL (ref 11.1–15.9)
IMM GRANULOCYTES # BLD AUTO: 0 X10E3/UL (ref 0–0.1)
IMM GRANULOCYTES NFR BLD AUTO: 0 %
LYMPHOCYTES # BLD AUTO: 1.2 X10E3/UL (ref 0.7–3.1)
LYMPHOCYTES NFR BLD AUTO: 30 %
MCH RBC QN AUTO: 31.6 PG (ref 26.6–33)
MCHC RBC AUTO-ENTMCNC: 34 G/DL (ref 31.5–35.7)
MCV RBC AUTO: 93 FL (ref 79–97)
MONOCYTES # BLD AUTO: 0.4 X10E3/UL (ref 0.1–0.9)
MONOCYTES NFR BLD AUTO: 10 %
NEUTROPHILS # BLD AUTO: 2.2 X10E3/UL (ref 1.4–7)
NEUTROPHILS NFR BLD AUTO: 54 %
PLATELET # BLD AUTO: 232 X10E3/UL (ref 150–450)
POTASSIUM SERPL-SCNC: 4.8 MMOL/L (ref 3.5–5.2)
PROT SERPL-MCNC: 6.2 G/DL (ref 6–8.5)
RBC # BLD AUTO: 4.02 X10E6/UL (ref 3.77–5.28)
SODIUM SERPL-SCNC: 138 MMOL/L (ref 134–144)
T4 FREE SERPL-MCNC: 1.47 NG/DL (ref 0.82–1.77)
TSH SERPL DL<=0.005 MIU/L-ACNC: 3.21 UIU/ML (ref 0.45–4.5)
WBC # BLD AUTO: 4.1 X10E3/UL (ref 3.4–10.8)

## 2022-08-23 ENCOUNTER — PATIENT ROUNDING (BHMG ONLY) (OUTPATIENT)
Dept: FAMILY MEDICINE CLINIC | Facility: CLINIC | Age: 42
End: 2022-08-23

## 2022-08-23 DIAGNOSIS — F41.9 ANXIETY: ICD-10-CM

## 2022-08-23 RX ORDER — FLUOXETINE HYDROCHLORIDE 20 MG/1
20 CAPSULE ORAL DAILY
Qty: 90 CAPSULE | Refills: 1 | Status: SHIPPED | OUTPATIENT
Start: 2022-08-23

## 2022-08-23 NOTE — PROGRESS NOTES
August 23, 2022    Hello, may I speak with Antonieta Hill?    My name is Joselyn       [facility]  [address] [place] [id number] [phone].    Before we get started may I verify your date of birth? 1980    I am calling to officially welcome you to our practice and ask about your recent visit. Is this a good time to talk? No sent my chart message

## 2022-10-26 DIAGNOSIS — E03.9 HYPOTHYROIDISM, UNSPECIFIED TYPE: ICD-10-CM

## 2022-10-26 RX ORDER — LEVOTHYROXINE SODIUM 0.1 MG/1
100 TABLET ORAL DAILY
Qty: 90 TABLET | Refills: 3 | Status: SHIPPED | OUTPATIENT
Start: 2022-10-26

## 2022-10-26 RX ORDER — SPIRONOLACTONE 100 MG/1
100 TABLET, FILM COATED ORAL DAILY
Qty: 90 TABLET | Refills: 3 | Status: SHIPPED | OUTPATIENT
Start: 2022-10-26

## 2022-10-26 NOTE — TELEPHONE ENCOUNTER
Rx Refill Note  Requested Prescriptions     Pending Prescriptions Disp Refills   • levothyroxine (SYNTHROID, LEVOTHROID) 100 MCG tablet [Pharmacy Med Name: LEVOTHYROXINE 0.100MG (100MCG) TAB] 90 tablet 3     Sig: TAKE 1 TABLET BY MOUTH DAILY   • spironolactone (ALDACTONE) 100 MG tablet [Pharmacy Med Name: SPIRONOLACTONE 100MG TABLETS] 90 tablet 3     Sig: TAKE 1 TABLET BY MOUTH DAILY      Last office visit with prescribing clinician: 8/17/22  Next office visit with prescribing clinician: Visit date not found            Maria Esther Yoder MA/LMR  10/26/22, 08:35 EDT

## 2023-01-26 DIAGNOSIS — F41.9 ANXIETY: ICD-10-CM

## 2023-01-26 RX ORDER — FLUOXETINE HYDROCHLORIDE 40 MG/1
40 CAPSULE ORAL DAILY
Qty: 90 CAPSULE | Refills: 0 | Status: SHIPPED | OUTPATIENT
Start: 2023-01-26

## 2023-05-08 DIAGNOSIS — F41.9 ANXIETY: ICD-10-CM

## 2023-05-09 RX ORDER — FLUOXETINE HYDROCHLORIDE 40 MG/1
40 CAPSULE ORAL DAILY
Qty: 90 CAPSULE | Refills: 3 | Status: SHIPPED | OUTPATIENT
Start: 2023-05-09

## 2023-09-27 ENCOUNTER — OFFICE VISIT (OUTPATIENT)
Dept: FAMILY MEDICINE CLINIC | Facility: CLINIC | Age: 43
End: 2023-09-27
Payer: COMMERCIAL

## 2023-09-27 VITALS
HEIGHT: 70 IN | SYSTOLIC BLOOD PRESSURE: 90 MMHG | OXYGEN SATURATION: 98 % | WEIGHT: 155 LBS | DIASTOLIC BLOOD PRESSURE: 60 MMHG | BODY MASS INDEX: 22.19 KG/M2 | TEMPERATURE: 97.4 F | HEART RATE: 62 BPM

## 2023-09-27 DIAGNOSIS — E03.9 HYPOTHYROIDISM, UNSPECIFIED TYPE: ICD-10-CM

## 2023-09-27 DIAGNOSIS — Z13.0 SCREENING FOR DEFICIENCY ANEMIA: ICD-10-CM

## 2023-09-27 DIAGNOSIS — Z13.6 SCREENING FOR ISCHEMIC HEART DISEASE: ICD-10-CM

## 2023-09-27 DIAGNOSIS — F41.9 ANXIETY: ICD-10-CM

## 2023-09-27 DIAGNOSIS — Z12.11 SCREENING FOR COLON CANCER: ICD-10-CM

## 2023-09-27 DIAGNOSIS — Z00.00 ENCOUNTER FOR GENERAL HEALTH EXAMINATION: Primary | ICD-10-CM

## 2023-09-27 PROCEDURE — 99396 PREV VISIT EST AGE 40-64: CPT | Performed by: STUDENT IN AN ORGANIZED HEALTH CARE EDUCATION/TRAINING PROGRAM

## 2023-09-27 RX ORDER — TIZANIDINE 2 MG/1
TABLET ORAL
COMMUNITY
Start: 2023-08-11 | End: 2023-09-27 | Stop reason: SDUPTHER

## 2023-09-27 RX ORDER — LEVOTHYROXINE SODIUM 0.1 MG/1
100 TABLET ORAL DAILY
Qty: 90 TABLET | Refills: 3 | Status: SHIPPED | OUTPATIENT
Start: 2023-09-27

## 2023-09-27 RX ORDER — FLUOXETINE HYDROCHLORIDE 40 MG/1
40 CAPSULE ORAL DAILY
Qty: 90 CAPSULE | Refills: 3 | Status: SHIPPED | OUTPATIENT
Start: 2023-09-27

## 2023-09-27 RX ORDER — TIZANIDINE 2 MG/1
2-4 TABLET ORAL EVERY 8 HOURS PRN
Qty: 30 TABLET | Refills: 2 | Status: SHIPPED | OUTPATIENT
Start: 2023-09-27

## 2023-09-27 RX ORDER — SPIRONOLACTONE 100 MG/1
100 TABLET, FILM COATED ORAL DAILY
Qty: 90 TABLET | Refills: 3 | Status: SHIPPED | OUTPATIENT
Start: 2023-09-27

## 2023-09-28 LAB
ALBUMIN SERPL-MCNC: 4.6 G/DL (ref 3.5–5.2)
ALBUMIN/GLOB SERPL: 2.3 G/DL
ALP SERPL-CCNC: 47 U/L (ref 39–117)
ALT SERPL-CCNC: 15 U/L (ref 1–33)
AST SERPL-CCNC: 18 U/L (ref 1–32)
BASOPHILS # BLD AUTO: 0.06 10*3/MM3 (ref 0–0.2)
BASOPHILS NFR BLD AUTO: 1.1 % (ref 0–1.5)
BILIRUB SERPL-MCNC: <0.2 MG/DL (ref 0–1.2)
BUN SERPL-MCNC: 14 MG/DL (ref 6–20)
BUN/CREAT SERPL: 16.5 (ref 7–25)
CALCIUM SERPL-MCNC: 9.8 MG/DL (ref 8.6–10.5)
CHLORIDE SERPL-SCNC: 101 MMOL/L (ref 98–107)
CHOLEST SERPL-MCNC: 188 MG/DL (ref 0–200)
CO2 SERPL-SCNC: 28.4 MMOL/L (ref 22–29)
CREAT SERPL-MCNC: 0.85 MG/DL (ref 0.57–1)
EGFRCR SERPLBLD CKD-EPI 2021: 86.2 ML/MIN/1.73
EOSINOPHIL # BLD AUTO: 0.39 10*3/MM3 (ref 0–0.4)
EOSINOPHIL NFR BLD AUTO: 6.8 % (ref 0.3–6.2)
ERYTHROCYTE [DISTWIDTH] IN BLOOD BY AUTOMATED COUNT: 12.4 % (ref 12.3–15.4)
GLOBULIN SER CALC-MCNC: 2 GM/DL
GLUCOSE SERPL-MCNC: 95 MG/DL (ref 65–99)
HCT VFR BLD AUTO: 36.5 % (ref 34–46.6)
HDLC SERPL-MCNC: 71 MG/DL (ref 40–60)
HGB BLD-MCNC: 12.3 G/DL (ref 12–15.9)
IMM GRANULOCYTES # BLD AUTO: 0.01 10*3/MM3 (ref 0–0.05)
IMM GRANULOCYTES NFR BLD AUTO: 0.2 % (ref 0–0.5)
LDLC SERPL CALC-MCNC: 99 MG/DL (ref 0–100)
LYMPHOCYTES # BLD AUTO: 1.75 10*3/MM3 (ref 0.7–3.1)
LYMPHOCYTES NFR BLD AUTO: 30.6 % (ref 19.6–45.3)
MCH RBC QN AUTO: 31.6 PG (ref 26.6–33)
MCHC RBC AUTO-ENTMCNC: 33.7 G/DL (ref 31.5–35.7)
MCV RBC AUTO: 93.8 FL (ref 79–97)
MONOCYTES # BLD AUTO: 0.44 10*3/MM3 (ref 0.1–0.9)
MONOCYTES NFR BLD AUTO: 7.7 % (ref 5–12)
NEUTROPHILS # BLD AUTO: 3.06 10*3/MM3 (ref 1.7–7)
NEUTROPHILS NFR BLD AUTO: 53.6 % (ref 42.7–76)
NRBC BLD AUTO-RTO: 0.2 /100 WBC (ref 0–0.2)
PLATELET # BLD AUTO: 266 10*3/MM3 (ref 140–450)
POTASSIUM SERPL-SCNC: 4.4 MMOL/L (ref 3.5–5.2)
PROT SERPL-MCNC: 6.6 G/DL (ref 6–8.5)
RBC # BLD AUTO: 3.89 10*6/MM3 (ref 3.77–5.28)
SODIUM SERPL-SCNC: 137 MMOL/L (ref 136–145)
TRIGL SERPL-MCNC: 100 MG/DL (ref 0–150)
TSH SERPL DL<=0.005 MIU/L-ACNC: 3.85 UIU/ML (ref 0.27–4.2)
VLDLC SERPL CALC-MCNC: 18 MG/DL (ref 5–40)
WBC # BLD AUTO: 5.71 10*3/MM3 (ref 3.4–10.8)

## 2023-09-28 NOTE — PROGRESS NOTES
Please let her know that lab results were stable from prior.  LDL cholesterol is significantly improved from 1 year ago.  This lowers her risk for cardiovascular disease. Eosinophils were slightly elevated which is consistent with allergies.     Repeat yearly.

## 2023-09-28 NOTE — PROGRESS NOTES
"Chief Complaint  Med Refill (All meds) and GI Problem (Pt requested colonoscopy (family hx of colon issues)/-would like to discuss constipation )    Subjective        Antonieta Hill presents to [facility]  History of Present Illness  43yoF with hypothyroidism, anxiety who presents for annual exam.     She has noticed fluctuations in bowel habits. She has normal BM through weekend but once she starts work week will not have bowel movement until Thurs/Fri. She states that if she is not able to go in the morning prior to going to work, she will not go the rest of the day. No N/V, abd pain. She stays hydrated - drinks 1-2 telly cups per day at work. She is a teacher. Eats a healthy diet.     She is due for cscope as she is 44yo. Immunizations UTD. Labs due. Mammogram/pap with GYN.    Objective   Vital Signs:  BP 90/60 (BP Location: Right arm, Patient Position: Sitting, Cuff Size: Adult)   Pulse 62   Temp 97.4 °F (36.3 °C) (Infrared)   Ht 177.8 cm (70\")   Wt 70.3 kg (155 lb)   SpO2 98%   BMI 22.24 kg/m²   Estimated body mass index is 22.24 kg/m² as calculated from the following:    Height as of this encounter: 177.8 cm (70\").    Weight as of this encounter: 70.3 kg (155 lb).       BMI is within normal parameters. No other follow-up for BMI required.      Physical Exam  Constitutional:       General: She is not in acute distress.  Eyes:      Conjunctiva/sclera: Conjunctivae normal.   Cardiovascular:      Rate and Rhythm: Normal rate and regular rhythm.   Pulmonary:      Effort: Pulmonary effort is normal. No respiratory distress.      Breath sounds: Normal breath sounds.   Skin:     General: Skin is warm and dry.   Neurological:      Mental Status: She is alert and oriented to person, place, and time.   Psychiatric:         Mood and Affect: Mood normal.         Behavior: Behavior normal.      Result Review :  The following data was reviewed by: Grisel Kumar MD on " 09/27/2023:  Common labs          9/27/2023    15:46   Common Labs   Glucose 95    BUN 14    Creatinine 0.85    Sodium 137    Potassium 4.4    Chloride 101    Calcium 9.8    Total Protein 6.6    Albumin 4.6    Total Bilirubin <0.2    Alkaline Phosphatase 47    AST (SGOT) 18    ALT (SGPT) 15    WBC 5.71    Hemoglobin 12.3    Hematocrit 36.5    Platelets 266    Total Cholesterol 188    Triglycerides 100    HDL Cholesterol 71    LDL Cholesterol  99      Data reviewed : none             Assessment and Plan   Diagnoses and all orders for this visit:    1. Encounter for general health examination (Primary)  Assessment & Plan:  Colonoscopy: due for routine screening, ordered today.  Cervical Cancer Screening: Follows with OB GYN at [place].  Mammogram: UTD. Obtained through GYN  LDCT: Never smoker  DEXA: Indicated at age 63    Immunizations: Up-to-date  Labs: Ordered today    Patient was counseled in regards to maintaining a healthy lifestyle, rich in whole grains, fruits and vegetables. Limit high saturated fats and processed sugars. Maintain an active lifestyle to promote overall health and well being.         2. Anxiety  -     FLUoxetine (PROzac) 40 MG capsule; Take 1 capsule by mouth Daily.  Dispense: 90 capsule; Refill: 3    3. Hypothyroidism, unspecified type  -     levothyroxine (SYNTHROID, LEVOTHROID) 100 MCG tablet; Take 1 tablet by mouth Daily.  Dispense: 90 tablet; Refill: 3  -     TSH    4. Screening for colon cancer  -     Ambulatory Referral For Screening Colonoscopy    5. Screening for ischemic heart disease  -     Comprehensive Metabolic Panel  -     Lipid Panel    6. Screening for deficiency anemia  -     CBC Auto Differential    Other orders  -     spironolactone (ALDACTONE) 100 MG tablet; Take 1 tablet by mouth Daily.  Dispense: 90 tablet; Refill: 3  -     tiZANidine (ZANAFLEX) 2 MG tablet; Take 1-2 tablets by mouth Every 8 (Eight) Hours As Needed for Muscle Spasms.  Dispense: 30 tablet; Refill: 2  -      CBC & Differential    With regards to bowel changes, I suspect she either has anxiety of defecating in public or alterations in hydration/fiber intake that causes constipation. I recommended regular water intake, consider fiber supplement. Avoiding constipation is important given history of hemorrhoids since having children and family history of diverticulosis/litis.          Follow Up   No follow-ups on file.  Patient was given instructions and counseling regarding her condition or for health maintenance advice. Please see specific information pulled into the AVS if appropriate.       Answers submitted by the patient for this visit:  Primary Reason for Visit (Submitted on 9/27/2023)  What is the primary reason for your visit?: Physical

## 2023-09-28 NOTE — ASSESSMENT & PLAN NOTE
Colonoscopy: due for routine screening, ordered today.  Cervical Cancer Screening: Follows with OB GYN at [place].  Mammogram: UTD. Obtained through GYN  LDCT: Never smoker  DEXA: Indicated at age 63    Immunizations: Up-to-date  Labs: Ordered today    Patient was counseled in regards to maintaining a healthy lifestyle, rich in whole grains, fruits and vegetables. Limit high saturated fats and processed sugars. Maintain an active lifestyle to promote overall health and well being.

## 2023-11-06 ENCOUNTER — PREP FOR SURGERY (OUTPATIENT)
Dept: SURGERY | Facility: SURGERY CENTER | Age: 43
End: 2023-11-06
Payer: COMMERCIAL

## 2023-11-06 DIAGNOSIS — Z12.11 ENCOUNTER FOR SCREENING FOR MALIGNANT NEOPLASM OF COLON: Primary | ICD-10-CM

## 2023-12-28 PROBLEM — Z12.11 ENCOUNTER FOR SCREENING FOR MALIGNANT NEOPLASM OF COLON: Status: ACTIVE | Noted: 2023-11-06

## 2024-01-03 ENCOUNTER — ANESTHESIA (OUTPATIENT)
Dept: SURGERY | Facility: SURGERY CENTER | Age: 44
End: 2024-01-03
Payer: COMMERCIAL

## 2024-01-03 ENCOUNTER — HOSPITAL ENCOUNTER (OUTPATIENT)
Facility: SURGERY CENTER | Age: 44
Setting detail: HOSPITAL OUTPATIENT SURGERY
Discharge: HOME | End: 2024-01-03
Attending: INTERNAL MEDICINE | Admitting: INTERNAL MEDICINE
Payer: COMMERCIAL

## 2024-01-03 ENCOUNTER — ANESTHESIA EVENT (OUTPATIENT)
Dept: SURGERY | Facility: SURGERY CENTER | Age: 44
End: 2024-01-03
Payer: COMMERCIAL

## 2024-01-03 VITALS
BODY MASS INDEX: 22.19 KG/M2 | TEMPERATURE: 98.2 F | DIASTOLIC BLOOD PRESSURE: 58 MMHG | RESPIRATION RATE: 16 BRPM | HEIGHT: 70 IN | OXYGEN SATURATION: 99 % | SYSTOLIC BLOOD PRESSURE: 111 MMHG | HEART RATE: 74 BPM | WEIGHT: 155 LBS

## 2024-01-03 LAB
B-HCG UR QL: NEGATIVE
EXPIRATION DATE: NORMAL
INTERNAL NEGATIVE CONTROL: NEGATIVE
INTERNAL POSITIVE CONTROL: POSITIVE
Lab: NORMAL

## 2024-01-03 PROCEDURE — 25810000003 LACTATED RINGERS PER 1000 ML: Performed by: INTERNAL MEDICINE

## 2024-01-03 PROCEDURE — 45378 DIAGNOSTIC COLONOSCOPY: CPT | Performed by: INTERNAL MEDICINE

## 2024-01-03 PROCEDURE — 25010000002 LIDOCAINE 1 % SOLUTION: Performed by: NURSE ANESTHETIST, CERTIFIED REGISTERED

## 2024-01-03 PROCEDURE — 81025 URINE PREGNANCY TEST: CPT | Performed by: INTERNAL MEDICINE

## 2024-01-03 PROCEDURE — 25010000002 PROPOFOL 10 MG/ML EMULSION: Performed by: NURSE ANESTHETIST, CERTIFIED REGISTERED

## 2024-01-03 PROCEDURE — 25010000002 PROPOFOL 200 MG/20ML EMULSION: Performed by: NURSE ANESTHETIST, CERTIFIED REGISTERED

## 2024-01-03 RX ORDER — SODIUM CHLORIDE 0.9 % (FLUSH) 0.9 %
3 SYRINGE (ML) INJECTION EVERY 12 HOURS SCHEDULED
Status: DISCONTINUED | OUTPATIENT
Start: 2024-01-03 | End: 2024-01-03 | Stop reason: HOSPADM

## 2024-01-03 RX ORDER — EPHEDRINE SULFATE 50 MG/ML
INJECTION INTRAVENOUS AS NEEDED
Status: DISCONTINUED | OUTPATIENT
Start: 2024-01-03 | End: 2024-01-03 | Stop reason: SURG

## 2024-01-03 RX ORDER — MAGNESIUM HYDROXIDE 1200 MG/15ML
LIQUID ORAL AS NEEDED
Status: DISCONTINUED | OUTPATIENT
Start: 2024-01-03 | End: 2024-01-03 | Stop reason: HOSPADM

## 2024-01-03 RX ORDER — PROPOFOL 10 MG/ML
INJECTION, EMULSION INTRAVENOUS AS NEEDED
Status: DISCONTINUED | OUTPATIENT
Start: 2024-01-03 | End: 2024-01-03 | Stop reason: SURG

## 2024-01-03 RX ORDER — LIDOCAINE HYDROCHLORIDE 10 MG/ML
INJECTION, SOLUTION INFILTRATION; PERINEURAL AS NEEDED
Status: DISCONTINUED | OUTPATIENT
Start: 2024-01-03 | End: 2024-01-03 | Stop reason: SURG

## 2024-01-03 RX ORDER — SODIUM CHLORIDE, SODIUM LACTATE, POTASSIUM CHLORIDE, CALCIUM CHLORIDE 600; 310; 30; 20 MG/100ML; MG/100ML; MG/100ML; MG/100ML
30 INJECTION, SOLUTION INTRAVENOUS CONTINUOUS PRN
Status: DISCONTINUED | OUTPATIENT
Start: 2024-01-03 | End: 2024-01-03 | Stop reason: HOSPADM

## 2024-01-03 RX ORDER — SODIUM CHLORIDE 0.9 % (FLUSH) 0.9 %
10 SYRINGE (ML) INJECTION AS NEEDED
Status: DISCONTINUED | OUTPATIENT
Start: 2024-01-03 | End: 2024-01-03 | Stop reason: HOSPADM

## 2024-01-03 RX ADMIN — LIDOCAINE HYDROCHLORIDE 50 MG: 10 INJECTION, SOLUTION INFILTRATION; PERINEURAL at 09:05

## 2024-01-03 RX ADMIN — EPHEDRINE SULFATE 10 MG: 50 INJECTION INTRAVENOUS at 09:13

## 2024-01-03 RX ADMIN — PROPOFOL 70 MG: 10 INJECTION, EMULSION INTRAVENOUS at 09:07

## 2024-01-03 RX ADMIN — PROPOFOL 30 MG: 10 INJECTION, EMULSION INTRAVENOUS at 09:11

## 2024-01-03 RX ADMIN — EPHEDRINE SULFATE 10 MG: 50 INJECTION INTRAVENOUS at 09:14

## 2024-01-03 RX ADMIN — PROPOFOL 200 MCG/KG/MIN: 10 INJECTION, EMULSION INTRAVENOUS at 09:07

## 2024-01-03 RX ADMIN — SODIUM CHLORIDE, POTASSIUM CHLORIDE, SODIUM LACTATE AND CALCIUM CHLORIDE 30 ML/HR: 600; 310; 30; 20 INJECTION, SOLUTION INTRAVENOUS at 07:54

## 2024-01-03 RX ADMIN — EPHEDRINE SULFATE 10 MG: 50 INJECTION INTRAVENOUS at 09:25

## 2024-01-03 NOTE — ANESTHESIA POSTPROCEDURE EVALUATION
"Patient: Antonieta Hill    Procedure Summary       Date: 01/03/24 Room / Location: [facility]  / [facility]    Anesthesia Start: 0902 Anesthesia Stop: 0930    Procedure: COLONOSCOPY to cecum Diagnosis:       Encounter for screening for malignant neoplasm of colon      (Encounter for screening for malignant neoplasm of colon [Z12.11])    Surgeons: Mahin Mcmillan MD Provider: Fiona Cortez MD    Anesthesia Type: MAC ASA Status: 2            Anesthesia Type: MAC    Vitals  Vitals Value Taken Time   /65 01/03/24 0938   Temp 36.8 °C (98.2 °F) 01/03/24 0929   Pulse 74 01/03/24 0938   Resp 16 01/03/24 0938   SpO2 99 % 01/03/24 0938           Post Anesthesia Care and Evaluation    Patient location during evaluation: bedside  Patient participation: complete - patient participated  Level of consciousness: awake  Pain management: adequate    Airway patency: patent  Anesthetic complications: No anesthetic complications    Cardiovascular status: acceptable  Respiratory status: acceptable  Hydration status: acceptable    Comments: /65 (BP Location: Left arm, Patient Position: Lying)   Pulse 74   Temp 36.8 °C (98.2 °F) (Temporal)   Resp 16   Ht 177.8 cm (70\")   Wt 70.3 kg (155 lb)   SpO2 99%   BMI 22.24 kg/m²     "
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
## 2024-01-03 NOTE — ANESTHESIA PREPROCEDURE EVALUATION
Anesthesia Evaluation     NPO Solid Status: > 8 hours  NPO Liquid Status: > 2 hours           Airway   Mallampati: II  TM distance: >3 FB  Neck ROM: full  Dental - normal exam     Pulmonary    (+) a smoker,  Cardiovascular   Exercise tolerance: good (4-7 METS)    Rhythm: regular  Rate: normal        Neuro/Psych  GI/Hepatic/Renal/Endo    (+) thyroid problem hypothyroidism    Musculoskeletal     (+) back pain, radiculopathy  Abdominal    Substance History      OB/GYN          Other                    Anesthesia Plan    ASA 2     MAC     intravenous induction     Anesthetic plan, risks, benefits, and alternatives have been provided, discussed and informed consent has been obtained with: patient.    CODE STATUS:

## 2024-01-23 ENCOUNTER — OFFICE VISIT (OUTPATIENT)
Dept: FAMILY MEDICINE CLINIC | Facility: CLINIC | Age: 44
End: 2024-01-23
Payer: COMMERCIAL

## 2024-01-23 VITALS
BODY MASS INDEX: 23.05 KG/M2 | OXYGEN SATURATION: 99 % | SYSTOLIC BLOOD PRESSURE: 92 MMHG | WEIGHT: 161 LBS | HEIGHT: 70 IN | DIASTOLIC BLOOD PRESSURE: 58 MMHG | HEART RATE: 66 BPM | TEMPERATURE: 97.1 F

## 2024-01-23 DIAGNOSIS — F41.9 ANXIETY: ICD-10-CM

## 2024-01-23 DIAGNOSIS — G44.209 TENSION HEADACHE: ICD-10-CM

## 2024-01-23 DIAGNOSIS — E03.9 HYPOTHYROIDISM, UNSPECIFIED TYPE: Primary | ICD-10-CM

## 2024-01-23 PROCEDURE — 1160F RVW MEDS BY RX/DR IN RCRD: CPT | Performed by: STUDENT IN AN ORGANIZED HEALTH CARE EDUCATION/TRAINING PROGRAM

## 2024-01-23 PROCEDURE — 99214 OFFICE O/P EST MOD 30 MIN: CPT | Performed by: STUDENT IN AN ORGANIZED HEALTH CARE EDUCATION/TRAINING PROGRAM

## 2024-01-23 PROCEDURE — 1159F MED LIST DOCD IN RCRD: CPT | Performed by: STUDENT IN AN ORGANIZED HEALTH CARE EDUCATION/TRAINING PROGRAM

## 2024-01-23 RX ORDER — BACLOFEN 10 MG/1
10 TABLET ORAL 3 TIMES DAILY PRN
Qty: 30 TABLET | Refills: 0 | Status: SHIPPED | OUTPATIENT
Start: 2024-01-23

## 2024-01-23 NOTE — ASSESSMENT & PLAN NOTE
Diagnosed with Hashimoto thyroiditis in 2018.  At that time symptoms were extreme tiredness.  TSH 8, TPO antibody mildly elevated.  Has been on 100 mcg of levothyroxine daily since that time. Has been out of for last month and not beeing taking.  Continue current dose pending TSH today.

## 2024-01-23 NOTE — PROGRESS NOTES
"Chief Complaint  Hypothyroidism (Pt state she has not had med due to a pharmacy error and would like labs rechecked but states she is having sx (headaches and weight gain))    Subjective        Antonieta Hill presents to [facility]  History of Present Illness  43yoF with hypothyroidism, anxiety who presents for hypothyroidism and headache.    Hypothyroidism-previously diagnosed with Hashimoto's thyroiditis and was prescribed levothyroxine 100 mcg daily.  She has not been taking for the last month as she has been out of the prescription.  She has noticed 5 pound weight gain and fatigue.    Headaches-increased frequency (few times a week).  Headaches are located at the temple and tend to be bilateral.  Have squeezing quality.  She takes over-the-counter ibuprofen or Tylenol if she absolutely needs it.  She has noticed she is taking it \"more than ever\".  She takes magnesium on a regular basis.  She stays well-hydrated.  She has been under increased stress recently related to her divorce.  She does not have any other neurologic symptoms.  She has had an eye exam within the last year.    Anxiety-has been on Prozac for many years and this has worked well in the past.  She is under increased stress as above but does not feel that dose needs to be adjusted.        Objective   Vital Signs:  BP 92/58 (BP Location: Right arm, Patient Position: Sitting, Cuff Size: Adult)   Pulse 66   Temp 97.1 °F (36.2 °C) (Infrared)   Ht 177.8 cm (70\")   Wt 73 kg (161 lb)   SpO2 99%   BMI 23.10 kg/m²   Estimated body mass index is 23.1 kg/m² as calculated from the following:    Height as of this encounter: 177.8 cm (70\").    Weight as of this encounter: 73 kg (161 lb).       BMI is within normal parameters. No other follow-up for BMI required.      Physical Exam  Constitutional:       General: She is not in acute distress.  Eyes:      Conjunctiva/sclera: Conjunctivae normal.   Cardiovascular:      Rate " and Rhythm: Normal rate and regular rhythm.   Pulmonary:      Effort: Pulmonary effort is normal. No respiratory distress.      Breath sounds: Normal breath sounds.   Skin:     General: Skin is warm and dry.   Neurological:      Mental Status: She is alert and oriented to person, place, and time.   Psychiatric:         Mood and Affect: Mood normal.         Behavior: Behavior normal.        Result Review :    The following data was reviewed by: Grisel Kumar MD on 01/23/2024:  Common labs          9/27/2023    15:46   Common Labs   Glucose 95    BUN 14    Creatinine 0.85    Sodium 137    Potassium 4.4    Chloride 101    Calcium 9.8    Total Protein 6.6    Albumin 4.6    Total Bilirubin <0.2    Alkaline Phosphatase 47    AST (SGOT) 18    ALT (SGPT) 15    WBC 5.71    Hemoglobin 12.3    Hematocrit 36.5    Platelets 266    Total Cholesterol 188    Triglycerides 100    HDL Cholesterol 71    LDL Cholesterol  99      Data reviewed : none             Assessment and Plan     Diagnoses and all orders for this visit:    1. Hypothyroidism, unspecified type (Primary)  Assessment & Plan:  Diagnosed with Hashimoto thyroiditis in 2018.  At that time symptoms were extreme tiredness.  TSH 8, TPO antibody mildly elevated.  Has been on 100 mcg of levothyroxine daily since that time. Has been out of for last month and not beeing taking.  Continue current dose pending TSH today.    Orders:  -     Thyroid Panel With TSH  -     Thyroid Peroxidase Antibody    2. Anxiety  Assessment & Plan:  Controlled on prozac 40mg daily.   Continue current dose.      3. Tension headache  Comments:  No red flag symptoms/neurologic deficits. Cautioned on rebound headache. Hydration and stress relief techniques recommended. Trial muscle relaxant.  Orders:  -     baclofen (LIORESAL) 10 MG tablet; Take 1 tablet by mouth 3 (Three) Times a Day As Needed for Muscle Spasms.  Dispense: 30 tablet; Refill: 0             Follow Up     Return in about 4 weeks (around  2/20/2024) for headache f/u.  Patient was given instructions and counseling regarding her condition or for health maintenance advice. Please see specific information pulled into the AVS if appropriate.

## 2024-01-24 DIAGNOSIS — E03.9 HYPOTHYROIDISM, UNSPECIFIED TYPE: ICD-10-CM

## 2024-01-24 LAB
FT4I SERPL CALC-MCNC: 1.5 (ref 1.2–4.9)
T3RU NFR SERPL: 28 % (ref 24–39)
T4 SERPL-MCNC: 5.4 UG/DL (ref 4.5–12)
THYROPEROXIDASE AB SERPL-ACNC: 129 IU/ML (ref 0–34)
TSH SERPL DL<=0.005 MIU/L-ACNC: 18.2 UIU/ML (ref 0.45–4.5)

## 2024-01-24 RX ORDER — LEVOTHYROXINE SODIUM 0.1 MG/1
100 TABLET ORAL DAILY
Qty: 90 TABLET | Refills: 3 | Status: SHIPPED | OUTPATIENT
Start: 2024-01-24

## 2024-01-24 NOTE — PROGRESS NOTES
TSH and TPO antibody are both very elevated. I would recommend restarting levothyroxine 100mcg daily. I have sent new prescription to pharmacy but if she has issues getting this medication please let me know.     I would recommend we repeat your TSH at appt in Feb.

## 2024-02-14 ENCOUNTER — OFFICE VISIT (OUTPATIENT)
Dept: FAMILY MEDICINE CLINIC | Facility: CLINIC | Age: 44
End: 2024-02-14
Payer: COMMERCIAL

## 2024-02-14 VITALS
WEIGHT: 158 LBS | HEART RATE: 68 BPM | HEIGHT: 70 IN | OXYGEN SATURATION: 99 % | DIASTOLIC BLOOD PRESSURE: 56 MMHG | BODY MASS INDEX: 22.62 KG/M2 | TEMPERATURE: 97.8 F | SYSTOLIC BLOOD PRESSURE: 92 MMHG

## 2024-02-14 DIAGNOSIS — F41.9 ANXIETY: ICD-10-CM

## 2024-02-14 DIAGNOSIS — E03.9 HYPOTHYROIDISM, UNSPECIFIED TYPE: Primary | ICD-10-CM

## 2024-02-14 DIAGNOSIS — G43.809 OTHER MIGRAINE WITHOUT STATUS MIGRAINOSUS, NOT INTRACTABLE: ICD-10-CM

## 2024-02-14 PROCEDURE — 99214 OFFICE O/P EST MOD 30 MIN: CPT | Performed by: STUDENT IN AN ORGANIZED HEALTH CARE EDUCATION/TRAINING PROGRAM

## 2024-02-14 PROCEDURE — 1160F RVW MEDS BY RX/DR IN RCRD: CPT | Performed by: STUDENT IN AN ORGANIZED HEALTH CARE EDUCATION/TRAINING PROGRAM

## 2024-02-14 PROCEDURE — 1159F MED LIST DOCD IN RCRD: CPT | Performed by: STUDENT IN AN ORGANIZED HEALTH CARE EDUCATION/TRAINING PROGRAM

## 2024-02-14 RX ORDER — SUMATRIPTAN 25 MG/1
TABLET, FILM COATED ORAL
Qty: 9 TABLET | Refills: 3 | Status: SHIPPED | OUTPATIENT
Start: 2024-02-14

## 2024-02-20 PROBLEM — G43.909 MIGRAINE: Status: ACTIVE | Noted: 2024-02-20

## 2024-02-21 NOTE — PROGRESS NOTES
"Chief Complaint  Hypothyroidism (Pt complains of not feeling back to normal since restarting levothyroxine /-restarted med about 2/3 weeks ago /-complains of feeling foggy, tired and unlike herself )    Subjective        Antonieta Hill presents to [facility]  History of Present Illness  43yoF with hypothyroidism, anxiety who presents for hypothyroidism and headache.    Hypothyroidism-previously diagnosed with Hashimoto's thyroiditis and was prescribed levothyroxine 100 mcg daily.  She had not been taking for the last month as she has been out of the prescription.  She had noticed 5 pound weight gain and fatigue. TSH was 18.2. Back on 100mcg daily and initially felt much better but has since felt a lot of fogginess, fatigue.    Headaches-increased frequency (few times a week).  Headaches are located at the temple and tend to be bilateral.  Have squeezing quality.  She takes over-the-counter ibuprofen or Tylenol if she absolutely needs it.  She has noticed she is taking it \"more than ever\".  She takes magnesium on a regular basis.  She stays well-hydrated.  She has been under increased stress recently related to her divorce but does not feel anxiety/mood uncontrolled.  She does not have any other neurologic symptoms.  She has had an eye exam within the last year. Recently had more migrainous features.     Anxiety-has been on Prozac for many years and this has worked well in the past.  She is under increased stress as above but does not feel that dose needs to be adjusted.        Objective   Vital Signs:  BP 92/56 (BP Location: Right arm, Patient Position: Sitting, Cuff Size: Adult)   Pulse 68   Temp 97.8 °F (36.6 °C) (Infrared)   Ht 177.8 cm (70\")   Wt 71.7 kg (158 lb)   SpO2 99%   BMI 22.67 kg/m²   Estimated body mass index is 22.67 kg/m² as calculated from the following:    Height as of this encounter: 177.8 cm (70\").    Weight as of this encounter: 71.7 kg (158 lb).   "     BMI is within normal parameters. No other follow-up for BMI required.      Physical Exam  Constitutional:       General: She is not in acute distress.  Eyes:      Conjunctiva/sclera: Conjunctivae normal.   Cardiovascular:      Rate and Rhythm: Normal rate and regular rhythm.   Pulmonary:      Effort: Pulmonary effort is normal. No respiratory distress.      Breath sounds: Normal breath sounds.   Abdominal:      Palpations: Abdomen is soft.      Tenderness: There is no abdominal tenderness.   Skin:     General: Skin is warm and dry.   Neurological:      General: No focal deficit present.      Mental Status: She is alert and oriented to person, place, and time.      Cranial Nerves: No cranial nerve deficit.      Motor: No weakness.   Psychiatric:         Mood and Affect: Mood normal.         Behavior: Behavior normal.        Result Review :    The following data was reviewed by: Grisel Kumar MD on 02/14/2024:  Common labs          9/27/2023    15:46   Common Labs   Glucose 95    BUN 14    Creatinine 0.85    Sodium 137    Potassium 4.4    Chloride 101    Calcium 9.8    Total Protein 6.6    Albumin 4.6    Total Bilirubin <0.2    Alkaline Phosphatase 47    AST (SGOT) 18    ALT (SGPT) 15    WBC 5.71    Hemoglobin 12.3    Hematocrit 36.5    Platelets 266    Total Cholesterol 188    Triglycerides 100    HDL Cholesterol 71    LDL Cholesterol  99      Data reviewed : none             Assessment and Plan     Diagnoses and all orders for this visit:    1. Hypothyroidism, unspecified type (Primary)  Assessment & Plan:  Diagnosed with Hashimoto thyroiditis in 2018.  At that time symptoms were extreme tiredness.  TSH 8, TPO antibody mildly elevated.  Has been on 100 mcg of levothyroxine daily until recently when she missed a few weeks of medication. TSH up to 18.2 at that time. She is back on levothyroxine 100mcg daily - somewhat better but still a lot of symptoms.   Recommend rechecking TSH in 4 weeks and adjusting dose  based on this.    Orders:  -     TSH; Future    2. Other migraine without status migrainosus, not intractable  Assessment & Plan:  New. No neurologic deficits.  Start triptan PRN as abortive.  Discussed increased hydration, good sleep hygiene, and magnesium supplementation.  Since this is a new diagnosis, recommend MRI Brain to rule out secondary causes.            Orders:  -     SUMAtriptan (Imitrex) 25 MG tablet; Take one tablet at onset of headache. May repeat dose one time in 2 hours if headache not relieved.  Dispense: 9 tablet; Refill: 3  -     MRI Brain Without Contrast; Future    3. Anxiety  Assessment & Plan:  Controlled on prozac 40mg daily.   Continue current dose.               Follow Up     No follow-ups on file.  Patient was given instructions and counseling regarding her condition or for health maintenance advice. Please see specific information pulled into the AVS if appropriate.

## 2024-02-21 NOTE — ASSESSMENT & PLAN NOTE
New. No neurologic deficits.  Start triptan PRN as abortive.  Discussed increased hydration, good sleep hygiene, and magnesium supplementation.  Since this is a new diagnosis, recommend MRI Brain to rule out secondary causes.

## 2024-02-21 NOTE — ASSESSMENT & PLAN NOTE
Diagnosed with Hashimoto thyroiditis in 2018.  At that time symptoms were extreme tiredness.  TSH 8, TPO antibody mildly elevated.  Has been on 100 mcg of levothyroxine daily until recently when she missed a few weeks of medication. TSH up to 18.2 at that time. She is back on levothyroxine 100mcg daily - somewhat better but still a lot of symptoms.   Recommend rechecking TSH in 4 weeks and adjusting dose based on this.

## 2024-02-22 DIAGNOSIS — E03.9 HYPOTHYROIDISM, UNSPECIFIED TYPE: ICD-10-CM

## 2024-03-09 LAB — TSH SERPL DL<=0.005 MIU/L-ACNC: 2.36 UIU/ML (ref 0.27–4.2)

## 2024-03-26 ENCOUNTER — PATIENT ROUNDING (BHMG ONLY) (OUTPATIENT)
Dept: URGENT CARE | Facility: CLINIC | Age: 44
End: 2024-03-26
Payer: COMMERCIAL

## 2024-03-26 NOTE — ED NOTES
Thank you for letting us care for you at your recent visit to [facility] [place]. We would love to hear about your experience with us. Was this the first time you have visited our location?    We’re always looking for ways to make our patients’ experience even better. Do you have any recommendations on ways we may improve?     Please be on the lookout for a survey about your recent visit from Kandace Barraza via text or email. We would greatly appreciate if you could fill that out and turn it back in. We want your voice to be heard and we value your feedback.     Thank you for choosing [facility] for your healthcare needs. I appreciate you taking the time to respond!

## 2024-06-05 ENCOUNTER — OFFICE VISIT (OUTPATIENT)
Dept: FAMILY MEDICINE CLINIC | Facility: CLINIC | Age: 44
End: 2024-06-05
Payer: COMMERCIAL

## 2024-06-05 VITALS
OXYGEN SATURATION: 98 % | BODY MASS INDEX: 21.93 KG/M2 | HEIGHT: 70 IN | SYSTOLIC BLOOD PRESSURE: 90 MMHG | TEMPERATURE: 97.3 F | HEART RATE: 72 BPM | DIASTOLIC BLOOD PRESSURE: 50 MMHG | WEIGHT: 153.2 LBS

## 2024-06-05 DIAGNOSIS — H65.02 NON-RECURRENT ACUTE SEROUS OTITIS MEDIA OF LEFT EAR: Primary | ICD-10-CM

## 2024-06-05 PROCEDURE — 1126F AMNT PAIN NOTED NONE PRSNT: CPT | Performed by: NURSE PRACTITIONER

## 2024-06-05 PROCEDURE — 99213 OFFICE O/P EST LOW 20 MIN: CPT | Performed by: NURSE PRACTITIONER

## 2024-06-05 PROCEDURE — 1159F MED LIST DOCD IN RCRD: CPT | Performed by: NURSE PRACTITIONER

## 2024-06-05 PROCEDURE — 1160F RVW MEDS BY RX/DR IN RCRD: CPT | Performed by: NURSE PRACTITIONER

## 2024-06-05 NOTE — PROGRESS NOTES
"Chief Complaint  Ear Problem (Left ear has a \"drumming sound\" weird vibration)    Subjective        Antonieta Hill presents to [facility]  History of Present Illness  Antonieta Hill is a 43 y.o. female who presents to clinic today with concerns of a left ear problem.  She has had a \"drumming\" sound in her left ear for about a week and a half.  No earache.  No new dizziness, lightheadedness, hearing loss, or discharge from the ear.  She has mild neck pain and headache, but on the right side.  Nothing brings on her symptoms or makes them worse.  No recent upper respiratory infections.     Objective   Vital Signs:  BP 90/50 (BP Location: Right arm, Patient Position: Sitting, Cuff Size: Adult)   Pulse 72   Temp 97.3 °F (36.3 °C)   Ht 177.8 cm (70\")   Wt 69.5 kg (153 lb 3.2 oz)   SpO2 98%   BMI 21.98 kg/m²   Estimated body mass index is 21.98 kg/m² as calculated from the following:    Height as of this encounter: 177.8 cm (70\").    Weight as of this encounter: 69.5 kg (153 lb 3.2 oz).       BMI is within normal parameters. No other follow-up for BMI required.      Physical Exam  Vitals reviewed.   Constitutional:       General: She is not in acute distress.     Appearance: Normal appearance. She is not ill-appearing, toxic-appearing or diaphoretic.   HENT:      Head: Normocephalic and atraumatic.      Left Ear: No drainage, swelling or tenderness. A middle ear effusion is present. Tympanic membrane is not injected.   Eyes:      General: No scleral icterus.        Right eye: No discharge.         Left eye: No discharge.   Pulmonary:      Effort: Pulmonary effort is normal. No respiratory distress.      Breath sounds: No rhonchi.   Musculoskeletal:      Cervical back: No rigidity or tenderness.   Neurological:      General: No focal deficit present.      Mental Status: She is alert and oriented to person, place, and time.      Motor: No weakness.      Gait: Gait normal. "   Psychiatric:         Mood and Affect: Mood normal.         Behavior: Behavior normal.        Result Review :                     Assessment and Plan     Diagnoses and all orders for this visit:    1. Non-recurrent acute serous otitis media of left ear (Primary)      Patient's symptoms are likely related to fluid behind the left eardrum.  Does not appear infectious.  Recommended daily Flonase and daily over-the-counter allergy medication.  If symptoms do not improve, can consider oral steroid.  Patient advised to call if symptoms worsen.         Follow Up     Return if symptoms worsen or fail to improve.  Patient was given instructions and counseling regarding her condition or for health maintenance advice. Please see specific information pulled into the AVS if appropriate.       Electronically signed by NIA Yip, 06/05/24, 1:40 PM EDT.

## 2024-09-30 DIAGNOSIS — F41.9 ANXIETY: ICD-10-CM

## 2024-10-01 RX ORDER — FLUOXETINE 40 MG/1
40 CAPSULE ORAL DAILY
Qty: 90 CAPSULE | Refills: 3 | Status: SHIPPED | OUTPATIENT
Start: 2024-10-01

## 2024-10-01 NOTE — TELEPHONE ENCOUNTER
Rx Refill Note  Requested Prescriptions     Pending Prescriptions Disp Refills    FLUoxetine (PROzac) 40 MG capsule [Pharmacy Med Name: FLUOXETINE 40MG CAPSULES] 90 capsule 3     Sig: TAKE 1 CAPSULE BY MOUTH DAILY      Last office visit with prescribing clinician: 2/14/2024   Last telemedicine visit with prescribing clinician: Visit date not found   Next office visit with prescribing clinician: Visit date not found                         Would you like a call back once the refill request has been completed: [] Yes [] No    If the office needs to give you a call back, can they leave a voicemail: [] Yes [] No    Jorge Ramos CMA/SANKET  10/01/24, 07:51 EDT

## 2024-12-12 RX ORDER — SPIRONOLACTONE 100 MG/1
100 TABLET, FILM COATED ORAL DAILY
Qty: 90 TABLET | Refills: 3 | Status: SHIPPED | OUTPATIENT
Start: 2024-12-12

## 2024-12-12 NOTE — TELEPHONE ENCOUNTER
Rx Refill Note  Requested Prescriptions     Pending Prescriptions Disp Refills    spironolactone (ALDACTONE) 100 MG tablet 90 tablet 3     Sig: Take 1 tablet by mouth Daily.      Last office visit with prescribing clinician: 2/14/2024   Last telemedicine visit with prescribing clinician: Visit date not found   Next office visit with prescribing clinician: Visit date not found                         Would you like a call back once the refill request has been completed: [] Yes [] No    If the office needs to give you a call back, can they leave a voicemail: [] Yes [] No    Jroge Ramos CMA/LMR  12/12/24, 10:02 EST

## 2024-12-12 NOTE — TELEPHONE ENCOUNTER
Caller: Antonieta Hill    Relationship: Self    Best call back number:   Telephone Information:   Mobile [phone]       Requested Prescriptions:   Requested Prescriptions     Pending Prescriptions Disp Refills    spironolactone (ALDACTONE) 100 MG tablet 90 tablet 3     Sig: Take 1 tablet by mouth Daily.        Pharmacy where request should be sent: [id number] [address] AT [facility] [phone] [facility] [phone]      Last office visit with prescribing clinician: 2/14/2024   Last telemedicine visit with prescribing clinician: Visit date not found   Next office visit with prescribing clinician: Visit date not found     Additional details provided by patient: PATIENT IS OUT     Does the patient have less than a 3 day supply:  [x] Yes  [] No    Would you like a call back once the refill request has been completed: [] Yes [x] No    If the office needs to give you a call back, can they leave a voicemail: [] Yes [x] No    Teressa Shaw Rep   12/12/24 09:50 EST

## 2025-01-03 DIAGNOSIS — E03.9 HYPOTHYROIDISM, UNSPECIFIED TYPE: ICD-10-CM

## 2025-01-03 RX ORDER — LEVOTHYROXINE SODIUM 100 UG/1
100 TABLET ORAL DAILY
Qty: 90 TABLET | Refills: 0 | Status: SHIPPED | OUTPATIENT
Start: 2025-01-03 | End: 2025-01-03 | Stop reason: SDUPTHER

## 2025-01-03 RX ORDER — LEVOTHYROXINE SODIUM 100 UG/1
100 TABLET ORAL DAILY
Qty: 90 TABLET | Refills: 0 | Status: SHIPPED | OUTPATIENT
Start: 2025-01-03

## 2025-01-03 NOTE — TELEPHONE ENCOUNTER
Rx accidentally sent to [facility] rather than [facility].   I called [facility] to verbally cancel  Resent to correct pharmacy, [facility]

## 2025-01-03 NOTE — TELEPHONE ENCOUNTER
Rx Refill Note  Requested Prescriptions     Pending Prescriptions Disp Refills    levothyroxine (SYNTHROID, LEVOTHROID) 100 MCG tablet 90 tablet 0     Sig: Take 1 tablet by mouth Daily.      Last office visit with prescribing clinician: 2/14/2024   Last telemedicine visit with prescribing clinician: Visit date not found   Next office visit with prescribing clinician: Visit date not found                         Would you like a call back once the refill request has been completed: [] Yes [] No    If the office needs to give you a call back, can they leave a voicemail: [] Yes [] No    Maria Esther Yoder CMA/LMR  01/03/25, 09:42 EST

## 2025-01-10 DIAGNOSIS — G43.809 OTHER MIGRAINE WITHOUT STATUS MIGRAINOSUS, NOT INTRACTABLE: ICD-10-CM

## 2025-01-10 DIAGNOSIS — G44.209 TENSION HEADACHE: ICD-10-CM

## 2025-01-10 DIAGNOSIS — F41.9 ANXIETY: ICD-10-CM

## 2025-01-10 DIAGNOSIS — E03.9 HYPOTHYROIDISM, UNSPECIFIED TYPE: ICD-10-CM

## 2025-01-10 RX ORDER — SPIRONOLACTONE 100 MG/1
100 TABLET, FILM COATED ORAL DAILY
Qty: 90 TABLET | Refills: 3 | Status: CANCELLED | OUTPATIENT
Start: 2025-01-10

## 2025-01-10 RX ORDER — LEVOTHYROXINE SODIUM 200 UG/1
100 TABLET ORAL DAILY
Qty: 90 TABLET | Refills: 0 | Status: SHIPPED | OUTPATIENT
Start: 2025-01-10

## 2025-01-10 RX ORDER — FLUOXETINE HYDROCHLORIDE 60 MG/1
30 TABLET, FILM COATED ORAL; ORAL DAILY
Qty: 90 TABLET | Refills: 0 | Status: SHIPPED | OUTPATIENT
Start: 2025-01-10

## 2025-04-09 DIAGNOSIS — E03.9 HYPOTHYROIDISM, UNSPECIFIED TYPE: ICD-10-CM

## 2025-04-09 RX ORDER — SPIRONOLACTONE 100 MG/1
200 TABLET, FILM COATED ORAL DAILY
Qty: 180 TABLET | Refills: 1 | Status: SHIPPED | OUTPATIENT
Start: 2025-04-09

## 2025-04-09 RX ORDER — FLUOXETINE HYDROCHLORIDE 60 MG/1
30 TABLET, FILM COATED ORAL; ORAL DAILY
Qty: 90 TABLET | Refills: 1 | Status: SHIPPED | OUTPATIENT
Start: 2025-04-09

## 2025-04-09 RX ORDER — LEVOTHYROXINE SODIUM 200 UG/1
100 TABLET ORAL DAILY
Qty: 90 TABLET | Refills: 1 | Status: SHIPPED | OUTPATIENT
Start: 2025-04-09

## 2025-04-09 NOTE — TELEPHONE ENCOUNTER
Rx Refill Note  Requested Prescriptions     Pending Prescriptions Disp Refills    levothyroxine (SYNTHROID, LEVOTHROID) 200 MCG tablet 90 tablet 0     Sig: Take 0.5 tablets by mouth Daily.    FLUoxetine (PROzac) 60 MG tablet 90 tablet 0     Sig: Take 0.5 tablets by mouth Daily.    spironolactone (ALDACTONE) 100 MG tablet 90 tablet 3     Sig: Take 2 tablets by mouth Daily.      Last office visit with prescribing clinician: 2/14/2024   Last telemedicine visit with prescribing clinician: Visit date not found   Next office visit with prescribing clinician: Visit date not found                         Would you like a call back once the refill request has been completed: [] Yes [] No    If the office needs to give you a call back, can they leave a voicemail: [] Yes [] No    Jimena Dee MA  04/09/25, 09:10 EDT

## 2025-07-14 RX ORDER — FLUOXETINE HYDROCHLORIDE 40 MG/1
40 CAPSULE ORAL DAILY
Qty: 90 CAPSULE | Refills: 0 | OUTPATIENT
Start: 2025-07-14

## 2025-07-18 DIAGNOSIS — E03.9 HYPOTHYROIDISM, UNSPECIFIED TYPE: ICD-10-CM

## 2025-07-18 RX ORDER — LEVOTHYROXINE SODIUM 200 UG/1
100 TABLET ORAL DAILY
Qty: 90 TABLET | Refills: 1 | OUTPATIENT
Start: 2025-07-18

## 2025-07-24 RX ORDER — FLUOXETINE HYDROCHLORIDE 60 MG/1
30 TABLET, FILM COATED ORAL; ORAL DAILY
Qty: 15 TABLET | Refills: 0 | Status: SHIPPED | OUTPATIENT
Start: 2025-07-24 | End: 2025-07-24 | Stop reason: DRUGHIGH

## 2025-07-25 RX ORDER — FLUOXETINE 10 MG/1
30 CAPSULE ORAL DAILY
Qty: 90 CAPSULE | Refills: 0 | Status: SHIPPED | OUTPATIENT
Start: 2025-07-25

## (undated) DEVICE — GOWN ISOL W/THUMB UNIV BLU BX/15

## (undated) DEVICE — Device

## (undated) DEVICE — FLEX ADVANTAGE 1500CC: Brand: FLEX ADVANTAGE

## (undated) DEVICE — CANN O2 ETCO2 FITS ALL CONN CO2 SMPL A/ 7IN DISP LF

## (undated) DEVICE — ADAPT CLN SCPE ENDO PORPOISE BX/50 DISP

## (undated) DEVICE — KT ORCA ORCAPOD DISP STRL

## (undated) DEVICE — GOWN ,SIRUS,NONREINFORCED 3XL: Brand: MEDLINE